# Patient Record
Sex: MALE | Employment: UNEMPLOYED | URBAN - METROPOLITAN AREA
[De-identification: names, ages, dates, MRNs, and addresses within clinical notes are randomized per-mention and may not be internally consistent; named-entity substitution may affect disease eponyms.]

---

## 2017-01-16 ENCOUNTER — HOSPITAL ENCOUNTER (OUTPATIENT)
Dept: LAB | Age: 52
Discharge: HOME | End: 2017-01-16

## 2017-01-16 DIAGNOSIS — E78.5 HYPERLIPIDEMIA, UNSPECIFIED HYPERLIPIDEMIA TYPE: ICD-10-CM

## 2017-01-16 DIAGNOSIS — I10 ESSENTIAL HYPERTENSION: ICD-10-CM

## 2017-01-16 DIAGNOSIS — E11.9 TYPE 2 DIABETES MELLITUS WITHOUT COMPLICATION, WITHOUT LONG-TERM CURRENT USE OF INSULIN (HCC): ICD-10-CM

## 2017-01-16 PROCEDURE — 99001 SPECIMEN HANDLING PT-LAB: CPT | Performed by: NURSE PRACTITIONER

## 2017-01-16 RX ORDER — ATORVASTATIN CALCIUM 20 MG/1
20 TABLET, FILM COATED ORAL
Qty: 30 TAB | Refills: 11 | Status: SHIPPED | OUTPATIENT
Start: 2017-01-16 | End: 2018-02-27 | Stop reason: SDUPTHER

## 2017-01-16 RX ORDER — METFORMIN HYDROCHLORIDE 500 MG/1
500 TABLET ORAL 2 TIMES DAILY WITH MEALS
Qty: 60 TAB | Refills: 11 | Status: SHIPPED | OUTPATIENT
Start: 2017-01-16 | End: 2017-10-24

## 2017-01-16 RX ORDER — LOSARTAN POTASSIUM 50 MG/1
50 TABLET ORAL DAILY
Qty: 30 TAB | Refills: 11 | Status: SHIPPED | OUTPATIENT
Start: 2017-01-16 | End: 2017-03-22 | Stop reason: SDUPTHER

## 2017-01-17 LAB
HCV GENOTYPE: NORMAL
HCV RNA SERPL NAA+PROBE-ACNC: NORMAL IU/ML
HCV RNA SERPL NAA+PROBE-LOG IU: NORMAL LOG10 IU/ML
TEST INFORMATION: NORMAL

## 2017-03-22 ENCOUNTER — OFFICE VISIT (OUTPATIENT)
Dept: INTERNAL MEDICINE CLINIC | Age: 52
End: 2017-03-22

## 2017-03-22 VITALS
TEMPERATURE: 98.1 F | DIASTOLIC BLOOD PRESSURE: 101 MMHG | HEIGHT: 62 IN | RESPIRATION RATE: 18 BRPM | OXYGEN SATURATION: 96 % | BODY MASS INDEX: 30.64 KG/M2 | SYSTOLIC BLOOD PRESSURE: 160 MMHG | HEART RATE: 92 BPM | WEIGHT: 166.5 LBS

## 2017-03-22 DIAGNOSIS — J30.89 PERENNIAL ALLERGIC RHINITIS, UNSPECIFIED ALLERGIC RHINITIS TRIGGER: ICD-10-CM

## 2017-03-22 DIAGNOSIS — E78.5 HYPERLIPIDEMIA, UNSPECIFIED HYPERLIPIDEMIA TYPE: ICD-10-CM

## 2017-03-22 DIAGNOSIS — J45.20 MILD INTERMITTENT ASTHMA WITHOUT COMPLICATION: ICD-10-CM

## 2017-03-22 DIAGNOSIS — I10 ESSENTIAL HYPERTENSION: ICD-10-CM

## 2017-03-22 DIAGNOSIS — B35.3 TINEA PEDIS OF BOTH FEET: ICD-10-CM

## 2017-03-22 DIAGNOSIS — R53.83 FATIGUE, UNSPECIFIED TYPE: ICD-10-CM

## 2017-03-22 DIAGNOSIS — E11.9 TYPE 2 DIABETES MELLITUS WITHOUT COMPLICATION, WITHOUT LONG-TERM CURRENT USE OF INSULIN (HCC): Primary | ICD-10-CM

## 2017-03-22 LAB
ALBUMIN UR QL STRIP: 80 MG/L
CHOLEST SERPL-MCNC: 171 MG/DL
CREATININE, URINE POC: 200 MG/DL
GLUCOSE POC: 192 MG/DL
HBA1C MFR BLD HPLC: 7.3 %
HDLC SERPL-MCNC: 59 MG/DL
LDL CHOLESTEROL POC: 96
MICROALBUMIN/CREAT RATIO POC: NORMAL MG/G
NON-HDL GOAL (POC): 112
TCHOL/HDL RATIO (POC): 2.9
TRIGL SERPL-MCNC: 80 MG/DL

## 2017-03-22 RX ORDER — PREDNISONE 20 MG/1
40 TABLET ORAL
Qty: 10 TAB | Refills: 0 | Status: SHIPPED | OUTPATIENT
Start: 2017-03-22 | End: 2017-10-24 | Stop reason: ALTCHOICE

## 2017-03-22 RX ORDER — LOSARTAN POTASSIUM 100 MG/1
100 TABLET ORAL DAILY
Qty: 30 TAB | Refills: 11 | Status: SHIPPED | OUTPATIENT
Start: 2017-03-22 | End: 2017-10-24 | Stop reason: ALTCHOICE

## 2017-03-22 RX ORDER — CHLORPHENIRAMINE MALEATE 4 MG
TABLET ORAL 2 TIMES DAILY
Qty: 15 G | Refills: 0 | Status: SHIPPED | OUTPATIENT
Start: 2017-03-22 | End: 2017-10-24

## 2017-03-22 NOTE — PATIENT INSTRUCTIONS
Take two tabs of LOSARTAN for 100 mg DOSE ONCE DAILY. Losartan (By mouth)   Losartan (raoul-VANESSA-tan)  Treats high blood pressure. Reduces the risk of stroke in patients with high blood pressure and an enlarged heart. Treats kidney disease in patients with diabetes. This medicine is an angiotensin receptor blocker (ARB). Brand Name(s):Cozaar   There may be other brand names for this medicine. When This Medicine Should Not Be Used: This medicine is not right for everyone. Do not use it if you had an allergic reaction to losartan, or if you are pregnant. Do not use this medicine together with aliskiren if you have diabetes. How to Use This Medicine:   Tablet  · Take your medicine as directed. Your dose may need to be changed several times to find what works best for you. · Drink plenty of fluids if you exercise, sweat more than usual, or have diarrhea or vomiting. · Read and follow the patient instructions that come with this medicine. Talk to your doctor or pharmacist if you have any questions. · Missed dose: Take a dose as soon as you remember. If it is almost time for your next dose, wait until then and take a regular dose. Do not take extra medicine to make up for a missed dose. · Store the medicine in a closed container at room temperature, away from heat, moisture, and direct light. Drugs and Foods to Avoid:   Ask your doctor or pharmacist before using any other medicine, including over-the-counter medicines, vitamins, and herbal products. · Some medicines can affect how losartan works.  Tell your doctor if you are using any of the following:   ¨ Lithium  ¨ Rifampin  ¨ A diuretic (water pill), such as spironolactone, triamterene, or amiloride  ¨ NSAID pain or arthritis medicine, such as aspirin, diclofenac, ibuprofen, or naproxen  ¨ Another blood pressure medicine, such as aliskiren, benazepril, enalapril, or lisinopril  · Ask your doctor before you use any medicine, supplement, or salt substitute that contains potassium. Warnings While Using This Medicine:   · It is not safe to take this medicine during pregnancy. It could harm an unborn baby. Tell your doctor right away if you become pregnant. · Tell your doctor if you are breastfeeding, or if you have kidney disease, liver disease, congestive heart failure, or diabetes. Tell your doctor if you have had angioedema that was caused by a blood pressure medicine. · This medicine could lower your blood pressure too much, especially when you first use it or if you are dehydrated. Stand or sit up slowly if you feel lightheaded or dizzy. · Your doctor will do lab tests at regular visits to check on the effects of this medicine. Keep all appointments. · Keep all medicine out of the reach of children. Never share your medicine with anyone. Possible Side Effects While Using This Medicine:   Call your doctor right away if you notice any of these side effects:  · Allergic reaction: Itching or hives, swelling in your face or hands, swelling or tingling in your mouth or throat, chest tightness, trouble breathing  · Change in how much or how often you urinate  · Confusion, weakness, uneven heartbeat, trouble breathing, numbness or tingling in your hands, feet, or lips  · Lightheadedness, dizziness, fainting  · Rapid weight gain, swelling in your hands, ankles, or feet  If you notice these less serious side effects, talk with your doctor:   · Diarrhea  · Tiredness  If you notice other side effects that you think are caused by this medicine, tell your doctor. Call your doctor for medical advice about side effects. You may report side effects to FDA at 5-038-WGB-7059  © 2016 [address] is for End User's use only and may not be sold, redistributed or otherwise used for commercial purposes. The above information is an  only. It is not intended as medical advice for individual conditions or treatments.  Talk to your doctor, nurse or pharmacist before following any medical regimen to see if it is safe and effective for you. Low Sodium Diet (2,000 Milligram): Care Instructions  Your Care Instructions  Too much sodium causes your body to hold on to extra water. This can raise your blood pressure and force your heart and kidneys to work harder. In very serious cases, this could cause you to be put in the hospital. It might even be life-threatening. By limiting sodium, you will feel better and lower your risk of serious problems. The most common source of sodium is salt. People get most of the salt in their diet from canned, prepared, and packaged foods. Fast food and restaurant meals also are very high in sodium. Your doctor will probably limit your sodium to less than 2,000 milligrams (mg) a day. This limit counts all the sodium in prepared and packaged foods and any salt you add to your food. Follow-up care is a key part of your treatment and safety. Be sure to make and go to all appointments, and call your doctor if you are having problems. It's also a good idea to know your test results and keep a list of the medicines you take. How can you care for yourself at home? Read food labels  · Read labels on cans and food packages. The labels tell you how much sodium is in each serving. Make sure that you look at the serving size. If you eat more than the serving size, you have eaten more sodium. · Food labels also tell you the Percent Daily Value for sodium. Choose products with low Percent Daily Values for sodium. · Be aware that sodium can come in forms other than salt, including monosodium glutamate (MSG), sodium citrate, and sodium bicarbonate (baking soda). MSG is often added to Asian food. When you eat out, you can sometimes ask for food without MSG or added salt.   Buy low-sodium foods  · Buy foods that are labeled \"unsalted\" (no salt added), \"sodium-free\" (less than 5 mg of sodium per serving), or \"low-sodium\" (less than 140 mg of sodium per serving). Foods labeled \"reduced-sodium\" and \"light sodium\" may still have too much sodium. Be sure to read the label to see how much sodium you are getting. · Buy fresh vegetables, or frozen vegetables without added sauces. Buy low-sodium versions of canned vegetables, soups, and other canned goods. Prepare low-sodium meals  · Cut back on the amount of salt you use in cooking. This will help you adjust to the taste. Do not add salt after cooking. One teaspoon of salt has about 2,300 mg of sodium. · Take the salt shaker off the table. · Flavor your food with garlic, lemon juice, onion, vinegar, herbs, and spices. Do not use soy sauce, lite soy sauce, steak sauce, onion salt, garlic salt, celery salt, mustard, or ketchup on your food. · Use low-sodium salad dressings, sauces, and ketchup. Or make your own salad dressings and sauces without adding salt. · Use less salt (or none) when recipes call for it. You can often use half the salt a recipe calls for without losing flavor. Other foods such as rice, pasta, and grains do not need added salt. · Rinse canned vegetables, and cook them in fresh water. This removes some--but not all--of the salt. · Avoid water that is naturally high in sodium or that has been treated with water softeners, which add sodium. Call your local water company to find out the sodium content of your water supply. If you buy bottled water, read the label and choose a sodium-free brand. Avoid high-sodium foods  · Avoid eating:  ¨ Smoked, cured, salted, and canned meat, fish, and poultry. ¨ Ham, ríos, hot dogs, and luncheon meats. ¨ Regular, hard, and processed cheese and regular peanut butter. ¨ Crackers with salted tops, and other salted snack foods such as pretzels, chips, and salted popcorn. ¨ Frozen prepared meals, unless labeled low-sodium. ¨ Canned and dried soups, broths, and bouillon, unless labeled sodium-free or low-sodium.   ¨ Canned vegetables, unless labeled sodium-free or low-sodium. ¨ [place] fries, pizza, tacos, and other fast foods. ¨ Pickles, olives, ketchup, and other condiments, especially soy sauce, unless labeled sodium-free or low-sodium. Where can you learn more? Go to http://sterling-bharti.info/. Enter W032 in the search box to learn more about \"Low Sodium Diet (2,000 Milligram): Care Instructions. \"  Current as of: July 26, 2016  Content Version: 11.1  © 4927-2576 [employer]. Care instructions adapted under license by [employer] (which disclaims liability or warranty for this information). If you have questions about a medical condition or this instruction, always ask your healthcare professional. [address] any warranty or liability for your use of this information.

## 2017-03-22 NOTE — PROGRESS NOTES
Issa Pinto is a 47 y.o. male and presents with Cholesterol Problem (follow up); Hypertension (follow up); and Diabetes (follow up)    Subjective:  Hypertension Review:  The patient has hypertension  Diet and Lifestyle: generally does try to follow a  low sodium diet, but drinks 4 cups of coffee daily. exercises some. Home BP Monitoring: is measured at home. Pertinent ROS: taking medications as instructed, no medication side effects noted. No TIA's, chest pain on exertion, dyspnea on exertion, or swelling of ankles. BP Readings from Last 3 Encounters:   03/22/17 (!) 160/101   12/22/16 (!) 151/96   04/17/16 126/71     Diabetes Mellitus Review:  He has diabetes mellitus, non-insulin  Diabetic ROS -negative for polyuria,polydipsia,polyphagia, and heat intolerance  Current diabetic medications include oral agents, NO insulin    Medication compliance: compliant MOST of the time  Diabetic diet compliance: compliant MOST of the time,   Yearly visit with dietician: no  Current exercise: walking, at work  Home glucose monitoring: is performed daily, averaging 140-160's  Any episodes of hypoglycemia? no  Known diabetic complications: none  Cardiovascular risk factors: family history, dyslipidemia, obesity, hypertension  Yearly foot exam: today  Eye exam current (within one year): not yet  Weight trend: stable   Is He on ACE inhibitor or angiotensin II receptor blocker? Yes   Lab review: orders written for new lab studies as appropriate; see orders. No results found for: HBA1C, HGBE8, [id number]          Dyslipidemia Review:  Patient presents for evaluation of lipids. Compliance with treatment thus far has been good. Denies myalgias, slurring speech, unilateral weakness, and chest pain. A repeat non-fasting lipid profile was done. The patient does NOT use medications that may worsen dyslipidemias (corticosteroids, progestins, anabolic steroids, diuretics, beta-blockers, amiodarone, cyclosporine, olanzapine).  The patient does exercise regularly at work. The patient is NOT known to have coexisting coronary artery disease. NOT taking Aspirin daily as prescribed/advised, pt preference. Asthma Review:  The patient is being seen for follow up of asthma,  currently progressively worsening with fatigue, congestion, and coughing at night. Asthma symptoms occur: infrequently. Wheezing when present is described as mild and easily NOT relieved with rescue bronchodilator, sensation of throat discomfort with Albuterol use reported. The patient reports NO use of a steroid inhaler. Frequency of use of quick-relief meds: rarely. Regimen compliance: The patient reports adherence to this regimen. Smoker, reluctant to quit. Review of Systems  Constitutional: negative for fevers, chills, anorexia and weight loss  Eyes:   negative for visual disturbance, drainage, and irritation  ENT:   negative for tinnitus,sore throat,nasal congestion,ear pain,and hoarseness  Respiratory:  negative for cough, hemoptysis, dyspnea, and wheezing  CV:   negative for chest pain, palpitations, and lower extremity edema  GI:   negative for nausea, vomiting, diarrhea, abdominal pain, and melena  Endo:               negative for polyuria,polydipsia,polyphagia, and heat intolerance  Genitourinary: negative for frequency, urgency, dysuria, retention, and hematuria  Integument:  negative for rash, ulcerations, and pruritus  Hematologic:  negative for easy bruising and bleeding  Musculoskel: negative for arthralgias, muscle weakness,and joint pain/swelling  Neurological:  negative for headaches, dizziness, vertigo,and memory/gait problems  Behavl/Psych: negative for feelings of anxiety, depression, suicide, and mood changes    Past Medical History:   Diagnosis Date    Diabetes ([facility] [address]     Hypertension      History reviewed. No pertinent surgical history.   Social History     Social History    Marital status:      Spouse name: N/A    Number of children: N/A    Years of education: N/A     Social History Main Topics    Smoking status: Current Every Day Smoker     Packs/day: 1.00    Smokeless tobacco: None    Alcohol use No    Drug use: No    Sexual activity: Yes     Partners: Female     Other Topics Concern    None     Social History Narrative     History reviewed. No pertinent family history. Current Outpatient Prescriptions   Medication Sig Dispense Refill    losartan (COZAAR) 50 mg tablet Take 1 Tab by mouth daily. 30 Tab 11    metFORMIN (GLUCOPHAGE) 500 mg tablet Take 1 Tab by mouth two (2) times daily (with meals). Indications: type 2 diabetes mellitus 60 Tab 11    atorvastatin (LIPITOR) 20 mg tablet Take 1 Tab by mouth nightly. Indications: mixed hyperlipidemia 30 Tab 11    cetirizine (ZYRTEC) 10 mg tablet Take 1 Tab by mouth daily. Indications: ALLERGIC RHINITIS 30 Tab 5    Cholecalciferol, Vitamin D3, (VITAMIN D3) 2,000 unit cap capsule Take 2,000 Units by mouth two (2) times a day. Indications: VITAMIN D DEFICIENCY (HIGH DOSE THERAPY) 60 Cap 11    albuterol (PROVENTIL HFA, VENTOLIN HFA, PROAIR HFA) 90 mcg/actuation inhaler Take 2 Puffs by inhalation every four (4) hours as needed for Wheezing or Shortness of Breath. 1 Inhaler 0     No Known Allergies    Objective:  Visit Vitals    BP (!) 160/101 (BP 1 Location: Right arm, BP Patient Position: Sitting)    Pulse 92    Temp 98.1 °F (36.7 °C) (Oral)    Resp 18    Ht 5' 2\" (1.575 m)    Wt 166 lb 8 oz (75.5 kg)    SpO2 96%    BMI 30.45 kg/m2     Wt Readings from Last 3 Encounters:   03/22/17 166 lb 8 oz (75.5 kg)   12/22/16 164 lb 8 oz (74.6 kg)   04/17/16 165 lb (74.8 kg)     Physical Exam:   General appearance - alert, well appearing, and in no distress. Mental status - A/O x 4, normal mood and affect. Neck -Supple ,normal CSP. FROM, non-tender. No significant adenopathy/thyromegaly. No JVD. Chest - CTA. Symmetric chest rise. No wheezing, rales or rhonchi.     Heart - Normal rate, regular rhythm. Normal S1, S2. No MGR or clicks. Abdomen - Soft,non-distended. Normoactive BS in all quadrants. NT, no mass or HSM. Ext- Radial, DP pulses, 2+ bilaterally. No pedal edema, clubbing, or cyanosis. Skin-Warm and dry. No hyperpigmentation, ulcerations, or suspicious lesions. Neuro - Normal speech, no focal findings or movement disorder. Normal strength, gait, and muscle tone. Diabetic foot exam performed by Keisha Starks NP     Measurement  Response Physician Comment   Monofilament  R - 4/6  L - 6/6    Pulse DP R - 2+ (normal)  L - 2+ (normal)    Pulse TP R - 1+ (weak)  L - 1+ (weak)    Structural deformity R - None  L - None    Skin Integrity / Deformity R - Mild - bluish discoloration with thick, yellow toenails and calloused plantar surface. L - Mild - bluish discoloration with thick, yellow toenails and calloused plantar surface. Results for orders placed or performed in visit on 03/22/17   AMB POC GLUCOSE BLOOD, BY GLUCOSE MONITORING DEVICE   Result Value Ref Range    Glucose  mg/dL   AMB POC LIPID PROFILE   Result Value Ref Range    Cholesterol (POC) 171     Triglycerides (POC) 80     HDL Cholesterol (POC) 59     LDL Cholesterol (POC) 96     Non-HDL Goal (POC) 112     TChol/HDL Ratio (POC) 2.9    AMB POC HEMOGLOBIN A1C   Result Value Ref Range    Hemoglobin A1c (POC) 7.3 %     Assessment/Plan:  Increased Losartan to 100 mg and prednisone 40 mg x 5 days prescribed. May need ICS or diuretic INI at f/u  Medication Side Effects and Warnings were discussed with patient: yes   Patient Labs were reviewed: yes  Patient Past Records were reviewed: yes    See below for other orders   Follow-up Disposition: Not on File    Pt has given consent verbally while in office for [employer] messaging. ICD-10-CM ICD-9-CM    1.  Type 2 diabetes mellitus without complication, without long-term current use of insulin (HCC) E11.9 250.00 AMB POC GLUCOSE BLOOD, BY GLUCOSE MONITORING DEVICE      AMB POC HEMOGLOBIN A1C   2. Hyperlipidemia, unspecified hyperlipidemia type E78.5 272.4 AMB POC LIPID PROFILE   3. Essential hypertension I10 401.9 AMB POC LIPID PROFILE   4. Fatigue, unspecified type R53.83 780.79      Orders Placed This Encounter    AMB POC GLUCOSE BLOOD, BY GLUCOSE MONITORING DEVICE    AMB POC LIPID PROFILE    AMB POC HEMOGLOBIN A1C       Brayan Lyn expressed understanding of plan. An After Visit Summary was offered/printed and given to the patient.

## 2017-03-22 NOTE — PROGRESS NOTES
Pt is here for   Chief Complaint   Patient presents with    Cholesterol Problem     follow up    Hypertension     follow up    Diabetes     follow up     Pt denies pain at this time. ..    1. Have you been to the ER, urgent care clinic since your last visit? Hospitalized since your last visit? No    2. Have you seen or consulted any other health care providers outside of the [address] [place] [address] since your last visit? Include any pap smears or colon screening.  No

## 2017-03-22 NOTE — MR AVS SNAPSHOT
Visit Information Date & Time Provider Department Dept. Phone Encounter #  
 3/22/2017 10:40 AM Silvia Newby NP [address] [facility] [phone] [id number] Follow-up Instructions Return in about 4 weeks (around 4/19/2017) for HTN, AR and asthma. Upcoming Health Maintenance Date Due  
 EYE EXAM RETINAL OR DILATED Q1 6/20/2017* HEMOGLOBIN A1C Q6M 6/22/2017 LIPID PANEL Q1 12/22/2017 FOBT Q 1 YEAR AGE 50-75 12/22/2017 FOOT EXAM Q1 3/22/2018 MICROALBUMIN Q1 3/22/2018 DTaP/Tdap/Td series (2 - Td) 12/22/2026 *Topic was postponed. The date shown is not the original due date. Allergies as of 3/22/2017  Review Complete On: 3/22/2017 By: Silvia Newby NP No Known Allergies Current Immunizations  Reviewed on 12/22/2016 Name Date Influenza Vaccine (Quad) Intradermal PF 12/22/2016 Tdap 12/22/2016 Not reviewed this visit You Were Diagnosed With   
  
 Codes Comments Type 2 diabetes mellitus without complication, without long-term current use of insulin (HCC)    -  Primary ICD-10-CM: E11.9 ICD-9-CM: 250.00 Hyperlipidemia, unspecified hyperlipidemia type     ICD-10-CM: E78.5 ICD-9-CM: 272.4 Essential hypertension     ICD-10-CM: I10 
ICD-9-CM: 401.9 Fatigue, unspecified type     ICD-10-CM: R53.83 ICD-9-CM: 780.79 Mild intermittent asthma without complication     PYN-19-ME: J45.20 ICD-9-CM: 493.90 Perennial allergic rhinitis, unspecified allergic rhinitis trigger     ICD-10-CM: J30.89 ICD-9-CM: 477.8 Vitals BP Pulse Temp Resp Height(growth percentile) Weight(growth percentile) (!) 160/101 (BP 1 Location: Right arm, BP Patient Position: Sitting) 92 98.1 °F (36.7 °C) (Oral) 18 5' 2\" (1.575 m) 166 lb 8 oz (75.5 kg) SpO2 BMI Smoking Status 96% 30.45 kg/m2 Current Every Day Smoker Vitals History BMI and BSA Data  Body Mass Index Body Surface Area  
 30.45 kg/m 2 1.82 m 2  
  
 Preferred Pharmacy Pharmacy Name Phone Se[facility] Nunez [facility] [address], [address] [facility] [phone] Your Updated Medication List  
  
   
This list is accurate as of: 3/22/17 11:38 AM.  Always use your most recent med list.  
  
  
  
  
 albuterol 90 mcg/actuation inhaler Commonly known as:  PROVENTIL HFA, VENTOLIN HFA, PROAIR HFA Take 2 Puffs by inhalation every four (4) hours as needed for Wheezing or Shortness of Breath. atorvastatin 20 mg tablet Commonly known as:  LIPITOR Take 1 Tab by mouth nightly. Indications: mixed hyperlipidemia  
  
 cetirizine 10 mg tablet Commonly known as:  ZYRTEC Take 1 Tab by mouth daily. Indications: ALLERGIC RHINITIS Cholecalciferol (Vitamin D3) 2,000 unit Cap capsule Commonly known as:  VITAMIN D3 Take 2,000 Units by mouth two (2) times a day. Indications: VITAMIN D DEFICIENCY (HIGH DOSE THERAPY)  
  
 losartan 100 mg tablet Commonly known as:  COZAAR Take 1 Tab by mouth daily. Indications: hypertension  
  
 metFORMIN 500 mg tablet Commonly known as:  GLUCOPHAGE Take 1 Tab by mouth two (2) times daily (with meals). Indications: type 2 diabetes mellitus  
  
 predniSONE 20 mg tablet Commonly known as:  Adrianna Brink Take 2 Tabs by mouth daily (with breakfast). Prescriptions Sent to Pharmacy Refills  
 losartan (COZAAR) 100 mg tablet 11 Sig: Take 1 Tab by mouth daily. Indications: hypertension Class: Normal  
 Pharmacy: [facility] [facility] Ph #: [phone] Route: Oral  
 predniSONE (DELTASONE) 20 mg tablet 0 Sig: Take 2 Tabs by mouth daily (with breakfast). Class: Normal  
 Pharmacy: [facility], [address] [facility] Ph #: [phone] Route: Oral  
  
We Performed the Following AMB POC GLUCOSE BLOOD, BY GLUCOSE MONITORING DEVICE [28251 CPT(R)] AMB POC HEMOGLOBIN A1C [07466 CPT(R)] AMB POC LIPID PROFILE [19566 CPT(R)] AMB POC URINE, MICROALBUMIN, SEMIQUANT (3 RESULTS) [83506 CPT(R)]  DIABETES FOOT EXAM [HM7 Custom] REFERRAL TO OPHTHALMOLOGY [REF57 Custom] Follow-up Instructions Return in about 4 weeks (around 4/19/2017) for HTN, AR and asthma. Referral Information Referral ID Referred By Referred To  
  
 [id number] Nandini Morton M.D.   
   [place] [address], [address] Visits Status Start Date End Date 1 New Request 3/22/17 3/22/18 If your referral has a status of pending review or denied, additional information will be sent to support the outcome of this decision. Patient Instructions Take two tabs of LOSARTAN for 100 mg DOSE ONCE DAILY. Losartan (By mouth) Losartan (raoul-VANESSA-tan) Treats high blood pressure. Reduces the risk of stroke in patients with high blood pressure and an enlarged heart. Treats kidney disease in patients with diabetes. This medicine is an angiotensin receptor blocker (ARB). Brand Name(s):Cozaar There may be other brand names for this medicine. When This Medicine Should Not Be Used: This medicine is not right for everyone. Do not use it if you had an allergic reaction to losartan, or if you are pregnant. Do not use this medicine together with aliskiren if you have diabetes. How to Use This Medicine:  
Tablet · Take your medicine as directed. Your dose may need to be changed several times to find what works best for you. · Drink plenty of fluids if you exercise, sweat more than usual, or have diarrhea or vomiting. · Read and follow the patient instructions that come with this medicine. Talk to your doctor or pharmacist if you have any questions. · Missed dose: Take a dose as soon as you remember.  If it is almost time for your next dose, wait until then and take a regular dose. Do not take extra medicine to make up for a missed dose. · Store the medicine in a closed container at room temperature, away from heat, moisture, and direct light. Drugs and Foods to Avoid: Ask your doctor or pharmacist before using any other medicine, including over-the-counter medicines, vitamins, and herbal products. · Some medicines can affect how losartan works. Tell your doctor if you are using any of the following: ¨ Lithium ¨ Rifampin ¨ A diuretic (water pill), such as spironolactone, triamterene, or amiloride ¨ NSAID pain or arthritis medicine, such as aspirin, diclofenac, ibuprofen, or naproxen ¨ Another blood pressure medicine, such as aliskiren, benazepril, enalapril, or lisinopril · Ask your doctor before you use any medicine, supplement, or salt substitute that contains potassium. Warnings While Using This Medicine: · It is not safe to take this medicine during pregnancy. It could harm an unborn baby. Tell your doctor right away if you become pregnant. · Tell your doctor if you are breastfeeding, or if you have kidney disease, liver disease, congestive heart failure, or diabetes. Tell your doctor if you have had angioedema that was caused by a blood pressure medicine. · This medicine could lower your blood pressure too much, especially when you first use it or if you are dehydrated. Stand or sit up slowly if you feel lightheaded or dizzy. · Your doctor will do lab tests at regular visits to check on the effects of this medicine. Keep all appointments. · Keep all medicine out of the reach of children. Never share your medicine with anyone. Possible Side Effects While Using This Medicine:  
Call your doctor right away if you notice any of these side effects: · Allergic reaction: Itching or hives, swelling in your face or hands, swelling or tingling in your mouth or throat, chest tightness, trouble breathing · Change in how much or how often you urinate · Confusion, weakness, uneven heartbeat, trouble breathing, numbness or tingling in your hands, feet, or lips · Lightheadedness, dizziness, fainting · Rapid weight gain, swelling in your hands, ankles, or feet If you notice these less serious side effects, talk with your doctor: · Diarrhea · Tiredness If you notice other side effects that you think are caused by this medicine, tell your doctor. Call your doctor for medical advice about side effects. You may report side effects to FDA at 7-646-PPA-0456 © 2016 [address] is for End User's use only and may not be sold, redistributed or otherwise used for commercial purposes. The above information is an  only. It is not intended as medical advice for individual conditions or treatments. Talk to your doctor, nurse or pharmacist before following any medical regimen to see if it is safe and effective for you. Low Sodium Diet (2,000 Milligram): Care Instructions Your Care Instructions Too much sodium causes your body to hold on to extra water. This can raise your blood pressure and force your heart and kidneys to work harder. In very serious cases, this could cause you to be put in the hospital. It might even be life-threatening. By limiting sodium, you will feel better and lower your risk of serious problems. The most common source of sodium is salt. People get most of the salt in their diet from canned, prepared, and packaged foods. Fast food and restaurant meals also are very high in sodium. Your doctor will probably limit your sodium to less than 2,000 milligrams (mg) a day. This limit counts all the sodium in prepared and packaged foods and any salt you add to your food. Follow-up care is a key part of your treatment and safety.  Be sure to make and go to all appointments, and call your doctor if you are having problems. It's also a good idea to know your test results and keep a list of the medicines you take. How can you care for yourself at home? Read food labels · Read labels on cans and food packages. The labels tell you how much sodium is in each serving. Make sure that you look at the serving size. If you eat more than the serving size, you have eaten more sodium. · Food labels also tell you the Percent Daily Value for sodium. Choose products with low Percent Daily Values for sodium. · Be aware that sodium can come in forms other than salt, including monosodium glutamate (MSG), sodium citrate, and sodium bicarbonate (baking soda). MSG is often added to Asian food. When you eat out, you can sometimes ask for food without MSG or added salt. Buy low-sodium foods · Buy foods that are labeled \"unsalted\" (no salt added), \"sodium-free\" (less than 5 mg of sodium per serving), or \"low-sodium\" (less than 140 mg of sodium per serving). Foods labeled \"reduced-sodium\" and \"light sodium\" may still have too much sodium. Be sure to read the label to see how much sodium you are getting. · Buy fresh vegetables, or frozen vegetables without added sauces. Buy low-sodium versions of canned vegetables, soups, and other canned goods. Prepare low-sodium meals · Cut back on the amount of salt you use in cooking. This will help you adjust to the taste. Do not add salt after cooking. One teaspoon of salt has about 2,300 mg of sodium. · Take the salt shaker off the table. · Flavor your food with garlic, lemon juice, onion, vinegar, herbs, and spices. Do not use soy sauce, lite soy sauce, steak sauce, onion salt, garlic salt, celery salt, mustard, or ketchup on your food. · Use low-sodium salad dressings, sauces, and ketchup. Or make your own salad dressings and sauces without adding salt. · Use less salt (or none) when recipes call for it.  You can often use half the salt a recipe calls for without losing flavor. Other foods such as rice, pasta, and grains do not need added salt. · Rinse canned vegetables, and cook them in fresh water. This removes somebut not allof the salt. · Avoid water that is naturally high in sodium or that has been treated with water softeners, which add sodium. Call your local water company to find out the sodium content of your water supply. If you buy bottled water, read the label and choose a sodium-free brand. Avoid high-sodium foods · Avoid eating: ¨ Smoked, cured, salted, and canned meat, fish, and poultry. ¨ Ham, ríos, hot dogs, and luncheon meats. ¨ Regular, hard, and processed cheese and regular peanut butter. ¨ Crackers with salted tops, and other salted snack foods such as pretzels, chips, and salted popcorn. ¨ Frozen prepared meals, unless labeled low-sodium. ¨ Canned and dried soups, broths, and bouillon, unless labeled sodium-free or low-sodium. ¨ Canned vegetables, unless labeled sodium-free or low-sodium. ¨ [place] fries, pizza, tacos, and other fast foods. ¨ Pickles, olives, ketchup, and other condiments, especially soy sauce, unless labeled sodium-free or low-sodium. Where can you learn more? Go to http://sterling-bharti.info/. Enter K267 in the search box to learn more about \"Low Sodium Diet (2,000 Milligram): Care Instructions. \" Current as of: July 26, 2016 Content Version: 11.1 © 6132-9616 [employer]. Care instructions adapted under license by [employer] (which disclaims liability or warranty for this information). If you have questions about a medical condition or this instruction, always ask your healthcare professional. [address] any warranty or liability for your use of this information. Introducing [facility]!    
 Kinga Tejada introduces [employer] patient portal. Now you can access parts of your medical record, email your doctor's office, and request medication refills online. 1. In your internet browser, go to [email or web address] 2. Click on the First Time User? Click Here link in the Sign In box. You will see the New Member Sign Up page. 3. Enter your Web[employer] Access Code exactly as it appears below. You will not need to use this code after youve completed the sign-up process. If you do not sign up before the expiration date, you must request a new code. · [employer] Access Code: [email or web address] Expires: 3/22/2017 12:37 PM 
 
4. Enter the last four digits of your Social Security Number (xxxx) and Date of Birth (mm/dd/yyyy) as indicated and click Submit. You will be taken to the next sign-up page. 5. Create a Webcrumbz ID. This will be your Webcrumb[employer] login ID and cannot be changed, so think of one that is secure and easy to remember. 6. Create a [employer] password. You can change your password at any time. 7. Enter your Password Reset Question and Answer. This can be used at a later time if you forget your password. 8. Enter your e-mail address. You will receive e-mail notification when new information is available in [address] 9. Click Sign Up. You can now view and download portions of your medical record. 10. Click the Download Summary menu link to download a portable copy of your medical information. If you have questions, please visit the Frequently Asked Questions section of the [employer] website. Remember, [employer] is NOT to be used for urgent needs. For medical emergencies, dial 911. Now available from your iPhone and Android! Please provide this summary of care documentation to your next provider. Your primary care clinician is listed as GEORGES Morales. If you have any questions after today's visit, please call [phone].

## 2017-10-24 ENCOUNTER — OFFICE VISIT (OUTPATIENT)
Dept: INTERNAL MEDICINE CLINIC | Age: 52
End: 2017-10-24

## 2017-10-24 VITALS
SYSTOLIC BLOOD PRESSURE: 165 MMHG | OXYGEN SATURATION: 95 % | TEMPERATURE: 99.9 F | DIASTOLIC BLOOD PRESSURE: 99 MMHG | HEART RATE: 81 BPM | WEIGHT: 160.6 LBS | HEIGHT: 62 IN | RESPIRATION RATE: 20 BRPM | BODY MASS INDEX: 29.55 KG/M2

## 2017-10-24 DIAGNOSIS — I10 ESSENTIAL HYPERTENSION: ICD-10-CM

## 2017-10-24 DIAGNOSIS — E78.5 HYPERLIPIDEMIA, UNSPECIFIED HYPERLIPIDEMIA TYPE: ICD-10-CM

## 2017-10-24 DIAGNOSIS — J45.21 MILD INTERMITTENT ASTHMA WITH ACUTE EXACERBATION: ICD-10-CM

## 2017-10-24 DIAGNOSIS — E11.9 TYPE 2 DIABETES MELLITUS WITHOUT COMPLICATION, WITHOUT LONG-TERM CURRENT USE OF INSULIN (HCC): Primary | ICD-10-CM

## 2017-10-24 DIAGNOSIS — J30.89 PERENNIAL ALLERGIC RHINITIS: ICD-10-CM

## 2017-10-24 LAB
CHOLEST SERPL-MCNC: 197 MG/DL
GLUCOSE POC: 164 MG/DL
HBA1C MFR BLD HPLC: 6.4 %
HDLC SERPL-MCNC: 61 MG/DL
LDL CHOLESTEROL POC: 136 MG/DL
TCHOL/HDL RATIO (POC): 3.2
TRIGL SERPL-MCNC: 46 MG/DL
VLDLC SERPL CALC-MCNC: 127 MG/DL

## 2017-10-24 RX ORDER — LOSARTAN POTASSIUM AND HYDROCHLOROTHIAZIDE 12.5; 5 MG/1; MG/1
1 TABLET ORAL DAILY
Qty: 30 TAB | Refills: 11 | Status: SHIPPED | OUTPATIENT
Start: 2017-10-24 | End: 2017-11-29 | Stop reason: SDUPTHER

## 2017-10-24 RX ORDER — PREDNISONE 20 MG/1
40 TABLET ORAL
Qty: 10 TAB | Refills: 0 | Status: SHIPPED | OUTPATIENT
Start: 2017-10-24 | End: 2017-11-29

## 2017-10-24 RX ORDER — ALBUTEROL SULFATE 90 UG/1
2 AEROSOL, METERED RESPIRATORY (INHALATION)
Qty: 1 INHALER | Refills: 0 | Status: SHIPPED | OUTPATIENT
Start: 2017-10-24 | End: 2019-03-12 | Stop reason: SDUPTHER

## 2017-10-24 NOTE — PATIENT INSTRUCTIONS
Try WIDE TOE/WIDTH shoes OR cushion on 4th toe to help with discomfort from pinky toe. STOP taking METFORMIN for NOW. RESTART Losartan and ATORVASTATIN. Controlling Your Asthma: Care Instructions  Your Care Instructions    Asthma is a long-term condition that affects your breathing. It causes the airways that lead to the lungs to swell. During an asthma attack, the airways swell and narrow. This makes it hard to breathe. You may wheeze or cough. If you have a bad attack, you may need emergency care. There are two things to do to treat asthma. · Control asthma over the long term. · Treat attacks when they occur. You and your doctor can make an asthma action plan. It tells you what medicines you need to take every day to control asthma symptoms and what to do if you have an asthma attack. Your asthma action plan can help prevent and treat attacks. When you keep your asthma under control, you can prevent severe attacks and lasting damage to your airways. You need to treat your asthma even when you are not having symptoms. Although asthma is a lifelong disease, treatment can help control it and help you stay healthy. Follow-up care is a key part of your treatment and safety. Be sure to make and go to all appointments, and call your doctor if you are having problems. It's also a good idea to know your test results and keep a list of the medicines you take. How can you care for yourself at home? To control asthma over the long term  Medicines  Controller medicines reduce swelling in your lungs. They also prevent asthma attacks. Take your controller medicine exactly as prescribed. Talk to your doctor if you have any problems with your medicine. · Inhaled corticosteroid is a common and effective controller medicine. Using it the right way can prevent or reduce most side effects. · Take your controller medicine every day, not just when you have symptoms.  It helps prevent problems before they occur.  · Your doctor may prescribe another medicine that you use along with the corticosteroid. This is often a long-acting bronchodilator. Do not take this medicine by itself. Using a long-acting bronchodilator by itself can increase your risk of a severe or fatal asthma attack. · Do not take inhaled corticosteroids or long-acting bronchodilators to stop an asthma attack that has already started. They don't work fast enough to help. · Talk to your doctor before you use other medicines. Some medicines, such as aspirin, can cause asthma attacks in some people. Education  · Learn what triggers an asthma attack. Avoid these triggers when you can. Common triggers include colds, smoke, air pollution, dust, pollen, mold, pets, cockroaches, stress, and cold air. · Check yourself for asthma symptoms to know which step to follow in your action plan. Watch for things like being short of breath, having chest tightness, coughing, and wheezing. Also notice if symptoms wake you up at night or if you get tired quickly when you exercise. · If you have a peak flow meter, use it to check how well you are breathing. It can help you know when an asthma attack is going to occur. Then you can take medicine to prevent the asthma attack or make it less severe. · Do not smoke or allow others to smoke around you. Avoid smoky places. Smoking makes asthma worse. If you need help quitting, talk to your doctor about stop-smoking programs and medicines. These can increase your chances of quitting for good. · Avoid colds and the flu. Get a pneumococcal vaccine shot. If you have had one before, ask your doctor whether you need a second dose. Get a flu vaccine every year, as soon as it's available. If you must be around people with colds or the flu, wash your hands often. To treat attacks when they occur  Use your asthma action plan when you have an attack. Your quick-relief medicine will stop an asthma attack.  It relaxes the muscles that get tight around the airways. If your doctor prescribed corticosteroid pills to use during an attack, take them as directed. They may take hours to work, but they may shorten the attack and help you breathe better. · Albuterol is an effective quick-relief inhaler. · Take your quick-relief medicine exactly as prescribed. · Always bring your asthma medicine with you when you travel. · You may need to use quick-relief medicine before you exercise. · Call your doctor if you think you are having a problem with your medicine. When should you call for help? Call 911 anytime you think you may need emergency care. For example, call if:  · You are having severe trouble breathing. Call your doctor now or seek immediate medical care if:  · Your symptoms do not get better after you have followed your asthma action plan. · You cough up yellow, dark brown, or bloody mucus (sputum). Watch closely for changes in your health, and be sure to contact your doctor if:  · Your coughing and wheezing get worse. · You need to use your quick-relief medicine on more than 2 days a week (unless it is just for exercise). · You need help figuring out what is triggering your asthma attacks. Where can you learn more? Go to http://sterling-bharti.info/. Enter G427 in the search box to learn more about \"Controlling Your Asthma: Care Instructions. \"  Current as of: March 25, 2017  Content Version: 11.3  © 6104-2148 [employer]. Care instructions adapted under license by [employer] (which disclaims liability or warranty for this information). If you have questions about a medical condition or this instruction, always ask your healthcare professional. [place] [address] any warranty or liability for your use of this information. Learning About the [address] Diet  What is the Mediterranean diet? The Mediterranean diet is a style of eating rather than a diet plan.  It features foods eaten in [place], [place], [facility] and [facility], and other countries along the [facility]. It emphasizes eating foods like fish, fruits, vegetables, beans, high-fiber breads and whole grains, nuts, and olive oil. This style of eating includes limited red meat, cheese, and sweets. Why choose the Mediterranean diet? A Mediterranean-style diet may improve heart health. It contains more fat than other heart-healthy diets. But the fats are mainly from nuts, unsaturated oils (such as fish oils and olive oil), and certain nut or seed oils (such as canola, soybean, or flaxseed oil). These fats may help protect the heart and blood vessels. How can you get started on the Mediterranean diet? Here are some things you can do to switch to a more Mediterranean way of eating. What to eat  · Eat a variety of fruits and vegetables each day, such as grapes, blueberries, tomatoes, broccoli, peppers, figs, olives, spinach, eggplant, beans, lentils, and chickpeas. · Eat a variety of whole-grain foods each day, such as oats, brown rice, and whole wheat bread, pasta, and couscous. · Eat fish at least 2 times a week. Try tuna, salmon, mackerel, lake trout, herring, or sardines. · Eat moderate amounts of low-fat dairy products, such as milk, cheese, or yogurt. · Eat moderate amounts of poultry and eggs. · Choose healthy (unsaturated) fats, such as nuts, olive oil, and certain nut or seed oils like canola, soybean, and flaxseed. · Limit unhealthy (saturated) fats, such as butter, palm oil, and coconut oil. And limit fats found in animal products, such as meat and dairy products made with whole milk. Try to eat red meat only a few times a month in very small amounts. · Limit sweets and desserts to only a few times a week. This includes sugar-sweetened drinks like soda. The Mediterranean diet may also include red wine with your meal--1 glass each day for women and up to 2 glasses a day for men.   Tips for eating at home  · Use herbs, spices, garlic, lemon zest, and citrus juice instead of salt to add flavor to foods. · Add avocado slices to your sandwich instead of ríos. · Have fish for lunch or dinner instead of red meat. Brush the fish with olive oil, and broil or grill it. · Sprinkle your salad with seeds or nuts instead of cheese. · Cook with olive or canola oil instead of butter or oils that are high in saturated fat. · Switch from 2% milk or whole milk to 1% or fat-free milk. · Dip raw vegetables in a vinaigrette dressing or hummus instead of dips made from mayonnaise or sour cream.  · Have a piece of fruit for dessert instead of a piece of cake. Try baked apples, or have some dried fruit. Tips for eating out  · Try broiled, grilled, baked, or poached fish instead of having it fried or breaded. · Ask your  to have your meals prepared with olive oil instead of butter. · Order dishes made with marinara sauce or sauces made from olive oil. Avoid sauces made from cream or mayonnaise. · Choose whole-grain breads, whole wheat pasta and pizza crust, brown rice, beans, and lentils. · Cut back on butter or margarine on bread. Instead, you can dip your bread in a small amount of olive oil. · Ask for a side salad or grilled vegetables instead of french fries or chips. Where can you learn more? Go to http://sterling-bharti.info/. Enter [phone] in the search box to learn more about \"Learning About the Mediterranean Diet. \"  Current as of: December 29, 2016  Content Version: 11.3  © 9413-0264 [employer]. Care instructions adapted under license by [employer] (which disclaims liability or warranty for this information). If you have questions about a medical condition or this instruction, always ask your healthcare professional. T[place] any warranty or liability for your use of this information.     You would benefit from more adequate rest. Please be sure to remove yourself from stimulating activities, like using your cellphone, tablet, or watching T.V. About 2 hours before bedtime. Instead engage in relaxing activities, like reading, yoga, or listening to calming music. Also try to refrain from using these devices in your sleep space or bed, as this trains your brain to stay awake in the bed versus sleeping. Avoid caffeine containing products like coffee, tea, chocolate, and soda within 2 hours of bedtime and try over the counter meds as needed to help like ZZZquil, benadryl, UNISOM or melatonin (they are non-habit forming).

## 2017-10-24 NOTE — PROGRESS NOTES
Pt is here for   Chief Complaint   Patient presents with    Hypertension     follow up    Diabetes     follow up    Cough     x 5 days      Pt denies pain at this time. 1. Have you been to the ER, urgent care clinic since your last visit? Hospitalized since your last visit? No    2. Have you seen or consulted any other health care providers outside of the [facility] since your last visit? Include any pap smears or colon screening.  No

## 2017-10-24 NOTE — MR AVS SNAPSHOT
Visit Information Date & Time Provider Department Dept. Phone Encounter #  
 10/24/2017  1:00 PM Damaso Pabon NP [facility]  10. [id number] Follow-up Instructions Return in about 3 months (around 1/24/2018) for HTN, DM f/u. BP check with wife's visit this week. Upcoming Health Maintenance Date Due FOBT Q 1 YEAR AGE 50-75 12/22/2017 EYE EXAM RETINAL OR DILATED Q1 11/23/2017* INFLUENZA AGE 9 TO ADULT 11/23/2017* FOOT EXAM Q1 3/22/2018 MICROALBUMIN Q1 3/22/2018 HEMOGLOBIN A1C Q6M 4/24/2018 LIPID PANEL Q1 10/24/2018 DTaP/Tdap/Td series (2 - Td) 12/22/2026 *Topic was postponed. The date shown is not the original due date. Allergies as of 10/24/2017  Review Complete On: 10/24/2017 By: Janny Staley. Bosher, LPN No Known Allergies Current Immunizations  Reviewed on 12/22/2016 Name Date Influenza Vaccine (Quad) Intradermal PF 12/22/2016 Tdap 12/22/2016 Not reviewed this visit You Were Diagnosed With   
  
 Codes Comments Type 2 diabetes mellitus without complication, without long-term current use of insulin (HCC)    -  Primary ICD-10-CM: E11.9 ICD-9-CM: 250.00 Hyperlipidemia, unspecified hyperlipidemia type     ICD-10-CM: E78.5 ICD-9-CM: 272.4 Essential hypertension     ICD-10-CM: I10 
ICD-9-CM: 401.9 Mild intermittent asthma with acute exacerbation     ICD-10-CM: J45.21 ICD-9-CM: 575.16 Perennial allergic rhinitis     ICD-10-CM: J30.89 ICD-9-CM: 477.8 Vitals BP Pulse Temp Resp Height(growth percentile) Weight(growth percentile) (!) 165/99 (BP 1 Location: Right arm, BP Patient Position: Sitting) 81 99.9 °F (37.7 °C) (Oral) 20 5' 2\" (1.575 m) 160 lb 9.6 oz (72.8 kg) SpO2 BMI Smoking Status 95% 29.37 kg/m2 Current Every Day Smoker Vitals History BMI and BSA Data Body Mass Index Body Surface Area  
 29.37 kg/m 2 1.78 m 2 Preferred Pharmacy Pharmacy Name Phone [facility] [address] [facility] [phone] Your Updated Medication List  
  
   
This list is accurate as of: 10/24/17  2:28 PM.  Always use your most recent med list.  
  
  
  
  
 albuterol 90 mcg/actuation inhaler Commonly known as:  PROVENTIL HFA, VENTOLIN HFA, PROAIR HFA Take 2 Puffs by inhalation every four (4) hours as needed for Wheezing or Shortness of Breath. atorvastatin 20 mg tablet Commonly known as:  LIPITOR Take 1 Tab by mouth nightly. Indications: mixed hyperlipidemia  
  
 cetirizine 10 mg tablet Commonly known as:  ZYRTEC Take 1 Tab by mouth daily. Indications: ALLERGIC RHINITIS Cholecalciferol (Vitamin D3) 2,000 unit Cap capsule Commonly known as:  VITAMIN D3 Take 2,000 Units by mouth two (2) times a day. Indications: VITAMIN D DEFICIENCY (HIGH DOSE THERAPY)  
  
 losartan 100 mg tablet Commonly known as:  COZAAR Take 1 Tab by mouth daily. Indications: hypertension  
  
 predniSONE 20 mg tablet Commonly known as:  Rasta Will Take 2 Tabs by mouth daily (with breakfast). Prescriptions Sent to Pharmacy Refills  
 albuterol (PROVENTIL HFA, VENTOLIN HFA, PROAIR HFA) 90 mcg/actuation inhaler 0 Sig: Take 2 Puffs by inhalation every four (4) hours as needed for Wheezing or Shortness of Breath. Class: Normal  
 Pharmacy: [facility], [address] AT [address] [place] [address] Ph #: [phone] Route: Inhalation  
 predniSONE (DELTASONE) 20 mg tablet 0 Sig: Take 2 Tabs by mouth daily (with breakfast). Class: Normal  
 Pharmacy: [facility], [address] AT [address] [place] [address] Ph #: [phone] Route: Oral  
  
We Performed the Following AMB POC GLUCOSE BLOOD, BY GLUCOSE MONITORING DEVICE [75646 CPT(R)] AMB POC HEMOGLOBIN A1C [77900 CPT(R)] AMB POC LIPID PROFILE [98394 CPT(R)] Follow-up Instructions Return in about 3 months (around 1/24/2018) for HTN, DM f/u. BP check with wife's visit this week. Patient Instructions Try WIDE TOE/WIDTH shoes OR cushion on 4th toe to help with discomfort from pinky toe. STOP taking METFORMIN for NOW. RESTART Losartan and ATORVASTATIN. Controlling Your Asthma: Care Instructions Your Care Instructions Asthma is a long-term condition that affects your breathing. It causes the airways that lead to the lungs to swell. During an asthma attack, the airways swell and narrow. This makes it hard to breathe. You may wheeze or cough. If you have a bad attack, you may need emergency care. There are two things to do to treat asthma. · Control asthma over the long term. · Treat attacks when they occur. You and your doctor can make an asthma action plan. It tells you what medicines you need to take every day to control asthma symptoms and what to do if you have an asthma attack. Your asthma action plan can help prevent and treat attacks. When you keep your asthma under control, you can prevent severe attacks and lasting damage to your airways. You need to treat your asthma even when you are not having symptoms. Although asthma is a lifelong disease, treatment can help control it and help you stay healthy. Follow-up care is a key part of your treatment and safety. Be sure to make and go to all appointments, and call your doctor if you are having problems. It's also a good idea to know your test results and keep a list of the medicines you take. How can you care for yourself at home? To control asthma over the long term Medicines Controller medicines reduce swelling in your lungs. They also prevent asthma attacks. Take your controller medicine exactly as prescribed. Talk to your doctor if you have any problems with your medicine. · Inhaled corticosteroid is a common and effective controller medicine. Using it the right way can prevent or reduce most side effects. · Take your controller medicine every day, not just when you have symptoms. It helps prevent problems before they occur. · Your doctor may prescribe another medicine that you use along with the corticosteroid. This is often a long-acting bronchodilator. Do not take this medicine by itself. Using a long-acting bronchodilator by itself can increase your risk of a severe or fatal asthma attack. · Do not take inhaled corticosteroids or long-acting bronchodilators to stop an asthma attack that has already started. They don't work fast enough to help. · Talk to your doctor before you use other medicines. Some medicines, such as aspirin, can cause asthma attacks in some people. Education · Learn what triggers an asthma attack. Avoid these triggers when you can. Common triggers include colds, smoke, air pollution, dust, pollen, mold, pets, cockroaches, stress, and cold air. · Check yourself for asthma symptoms to know which step to follow in your action plan. Watch for things like being short of breath, having chest tightness, coughing, and wheezing. Also notice if symptoms wake you up at night or if you get tired quickly when you exercise. · If you have a peak flow meter, use it to check how well you are breathing. It can help you know when an asthma attack is going to occur. Then you can take medicine to prevent the asthma attack or make it less severe. · Do not smoke or allow others to smoke around you. Avoid smoky places. Smoking makes asthma worse. If you need help quitting, talk to your doctor about stop-smoking programs and medicines. These can increase your chances of quitting for good. · Avoid colds and the flu. Get a pneumococcal vaccine shot. If you have had one before, ask your doctor whether you need a second dose.  Get a flu vaccine every year, as soon as it's available. If you must be around people with colds or the flu, wash your hands often. To treat attacks when they occur Use your asthma action plan when you have an attack. Your quick-relief medicine will stop an asthma attack. It relaxes the muscles that get tight around the airways. If your doctor prescribed corticosteroid pills to use during an attack, take them as directed. They may take hours to work, but they may shorten the attack and help you breathe better. · Albuterol is an effective quick-relief inhaler. · Take your quick-relief medicine exactly as prescribed. · Always bring your asthma medicine with you when you travel. · You may need to use quick-relief medicine before you exercise. · Call your doctor if you think you are having a problem with your medicine. When should you call for help? Call 911 anytime you think you may need emergency care. For example, call if: 
· You are having severe trouble breathing. Call your doctor now or seek immediate medical care if: 
· Your symptoms do not get better after you have followed your asthma action plan. · You cough up yellow, dark brown, or bloody mucus (sputum). Watch closely for changes in your health, and be sure to contact your doctor if: 
· Your coughing and wheezing get worse. · You need to use your quick-relief medicine on more than 2 days a week (unless it is just for exercise). · You need help figuring out what is triggering your asthma attacks. Where can you learn more? Go to http://sterling-bharti.info/. Enter N109 in the search box to learn more about \"Controlling Your Asthma: Care Instructions. \" Current as of: March 25, 2017 Content Version: 11.3 © 6659-5913 [employer]. Care instructions adapted under license by [employer] (which disclaims liability or warranty for this information).  If you have questions about a medical condition or this instruction, always ask your healthcare professional. Tuan Quarles any warranty or liability for your use of this information. Learning About the [address] Diet What is the Mediterranean diet? The Mediterranean diet is a style of eating rather than a diet plan. It features foods eaten in [place], [place], [place] and [place], and other countries along the [facility]. It emphasizes eating foods like fish, fruits, vegetables, beans, high-fiber breads and whole grains, nuts, and olive oil. This style of eating includes limited red meat, cheese, and sweets. Why choose the Mediterranean diet? A Mediterranean-style diet may improve heart health. It contains more fat than other heart-healthy diets. But the fats are mainly from nuts, unsaturated oils (such as fish oils and olive oil), and certain nut or seed oils (such as canola, soybean, or flaxseed oil). These fats may help protect the heart and blood vessels. How can you get started on the Mediterranean diet? Here are some things you can do to switch to a more Mediterranean way of eating. What to eat · Eat a variety of fruits and vegetables each day, such as grapes, blueberries, tomatoes, broccoli, peppers, figs, olives, spinach, eggplant, beans, lentils, and chickpeas. · Eat a variety of whole-grain foods each day, such as oats, brown rice, and whole wheat bread, pasta, and couscous. · Eat fish at least 2 times a week. Try tuna, salmon, mackerel, lake trout, herring, or sardines. · Eat moderate amounts of low-fat dairy products, such as milk, cheese, or yogurt. · Eat moderate amounts of poultry and eggs. · Choose healthy (unsaturated) fats, such as nuts, olive oil, and certain nut or seed oils like canola, soybean, and flaxseed. · Limit unhealthy (saturated) fats, such as butter, palm oil, and coconut oil.  And limit fats found in animal products, such as meat and dairy products made with whole milk. Try to eat red meat only a few times a month in very small amounts. · Limit sweets and desserts to only a few times a week. This includes sugar-sweetened drinks like soda. The Mediterranean diet may also include red wine with your meal1 glass each day for women and up to 2 glasses a day for men. Tips for eating at home · Use herbs, spices, garlic, lemon zest, and citrus juice instead of salt to add flavor to foods. · Add avocado slices to your sandwich instead of ríos. · Have fish for lunch or dinner instead of red meat. Brush the fish with olive oil, and broil or grill it. · Sprinkle your salad with seeds or nuts instead of cheese. · Cook with olive or canola oil instead of butter or oils that are high in saturated fat. · Switch from 2% milk or whole milk to 1% or fat-free milk. · Dip raw vegetables in a vinaigrette dressing or hummus instead of dips made from mayonnaise or sour cream. 
· Have a piece of fruit for dessert instead of a piece of cake. Try baked apples, or have some dried fruit. Tips for eating out · Try broiled, grilled, baked, or poached fish instead of having it fried or breaded. · Ask your  to have your meals prepared with olive oil instead of butter. · Order dishes made with marinara sauce or sauces made from olive oil. Avoid sauces made from cream or mayonnaise. · Choose whole-grain breads, whole wheat pasta and pizza crust, brown rice, beans, and lentils. · Cut back on butter or margarine on bread. Instead, you can dip your bread in a small amount of olive oil. · Ask for a side salad or grilled vegetables instead of french fries or chips. Where can you learn more? Go to http://sterling-bharti.info/. Enter 198-828-1990 in the search box to learn more about \"Learning About the Mediterranean Diet. \" Current as of: December 29, 2016 Content Version: 11.3 © 8212-9133 [employer].  Care instructions adapted under license by Ciarra Paul (which disclaims liability or warranty for this information). If you have questions about a medical condition or this instruction, always ask your healthcare professional. [address] any warranty or liability for your use of this information. You would benefit from more adequate rest. Please be sure to remove yourself from stimulating activities, like using your cellphone, tablet, or watching T.V. About 2 hours before bedtime. Instead engage in relaxing activities, like reading, yoga, or listening to calming music. Also try to refrain from using these devices in your sleep space or bed, as this trains your brain to stay awake in the bed versus sleeping. Avoid caffeine containing products like coffee, tea, chocolate, and soda within 2 hours of bedtime and try over the counter meds as needed to help like ZZZquil, benadryl, UNISOM or melatonin (they are non-habit forming). Introducing [facility] & HE[facility] SERVICES! Russ Greenwood introduces [email or web address] patient portal. Now you can access parts of your medical record, email your doctor's office, and request medication refills online. 1. In your internet browser, go to [email or web address] 2. Click on the First Time User? Click Here link in the Sign In box. You will see the New Member Sign Up page. 3. Enter your [email or web address] Access Code exactly as it appears below. You will not need to use this code after youve completed the sign-up process. If you do not sign up before the expiration date, you must request a new code. · [email or web address] Access Code: [email or web address] Expires: 1/22/2018  1:15 PM 
 
4. Enter the last four digits of your Social Security Number (xxxx) and Date of Birth (mm/dd/yyyy) as indicated and click Submit. You will be taken to the next sign-up page. 5. Create a [email or web address] ID. This will be your [email or web address] login ID and cannot be changed, so think of one that is secure and easy to remember. 6. Create a [employer] password. You can change your password at any time. 7. Enter your Password Reset Question and Answer. This can be used at a later time if you forget your password. 8. Enter your e-mail address. You will receive e-mail notification when new information is available in [address] 9. Click Sign Up. You can now view and download portions of your medical record. 10. Click the Download Summary menu link to download a portable copy of your medical information. If you have questions, please visit the Frequently Asked Questions section of the [employer] website. Remember, [employer] is NOT to be used for urgent needs. For medical emergencies, dial 911. Now available from your iPhone and Android! Please provide this summary of care documentation to your next provider. Your primary care clinician is listed as GEORGES De La Cruz. If you have any questions after today's visit, please call [phone].

## 2017-11-01 ENCOUNTER — CLINICAL SUPPORT (OUTPATIENT)
Dept: INTERNAL MEDICINE CLINIC | Age: 52
End: 2017-11-01

## 2017-11-01 VITALS — DIASTOLIC BLOOD PRESSURE: 91 MMHG | SYSTOLIC BLOOD PRESSURE: 157 MMHG

## 2017-11-01 DIAGNOSIS — I10 ESSENTIAL HYPERTENSION: Primary | ICD-10-CM

## 2017-11-01 NOTE — MR AVS SNAPSHOT
Visit Information Date & Time Provider Department Dept. Phone Encounter #  
 11/1/2017 11:20 AM Dallin Barillas NP [address] [facility] [phone] [id number] Follow-up Instructions Return in about 3 weeks (around 11/20/2017) for HTN med change f/u. Your Appointments 1/24/2018  1:20 PM  
ROUTINE CARE with Dallin Barillas NP  
[address] [facility] [address] [facility]) Appt Note: htn and dm [address] [facility] [address]  
[phone]  
  
   
 [address] [facility] Upcoming Health Maintenance Date Due FOBT Q 1 YEAR AGE 50-75 12/22/2017 EYE EXAM RETINAL OR DILATED Q1 11/23/2017* INFLUENZA AGE 9 TO ADULT 11/23/2017* FOOT EXAM Q1 3/22/2018 MICROALBUMIN Q1 3/22/2018 HEMOGLOBIN A1C Q6M 4/24/2018 LIPID PANEL Q1 10/24/2018 DTaP/Tdap/Td series (2 - Td) 12/22/2026 *Topic was postponed. The date shown is not the original due date. Allergies as of 11/1/2017  Review Complete On: 10/24/2017 By: Ramsey Gannon. Bosher, LPN No Known Allergies Current Immunizations  Reviewed on 12/22/2016 Name Date Influenza Vaccine (Quad) Intradermal PF 12/22/2016 Tdap 12/22/2016 Not reviewed this visit Vitals BP Smoking Status (!) 157/91 (BP 1 Location: Left arm, BP Patient Position: Sitting) Current Every Day Smoker Preferred Pharmacy Pharmacy Name Phone [address] [facility] [phone] Your Updated Medication List  
  
   
This list is accurate as of: 11/1/17 12:58 PM.  Always use your most recent med list.  
  
  
  
  
 albuterol 90 mcg/actuation inhaler Commonly known as:  PROVENTIL HFA, VENTOLIN HFA, PROAIR HFA Take 2 Puffs by inhalation every four (4) hours as needed for Wheezing or Shortness of Breath. atorvastatin 20 mg tablet Commonly known as:  LIPITOR Take 1 Tab by mouth nightly. Indications: mixed hyperlipidemia  
  
 cetirizine 10 mg tablet Commonly known as:  ZYRTEC Take 1 Tab by mouth daily. Indications: ALLERGIC RHINITIS Cholecalciferol (Vitamin D3) 2,000 unit Cap capsule Commonly known as:  VITAMIN D3 Take 2,000 Units by mouth two (2) times a day. Indications: VITAMIN D DEFICIENCY (HIGH DOSE THERAPY)  
  
 losartan-hydroCHLOROthiazide 50-12.5 mg per tablet Commonly known as:  HYZAAR Take 1 Tab by mouth daily. To replace LOSARTAN 100mg  
  
 predniSONE 20 mg tablet Commonly known as:  Wilfridoa Moment Take 2 Tabs by mouth daily (with breakfast). Follow-up Instructions Return in about 3 weeks (around 11/20/2017) for HTN med change f/u. Introducing [facility]! [employer] introduces Peak patient portal. Now you can access parts of your medical record, email your doctor's office, and request medication refills online. 1. In your internet browser, go to [email or web address] 2. Click on the First Time User? Click Here link in the Sign In box. You will see the New Member Sign Up page. 3. Enter your Peak Access Code exactly as it appears below. You will not need to use this code after youve completed the sign-up process. If you do not sign up before the expiration date, you must request a new code. · Peak Access Code: [email or web address] Expires: 1/22/2018  1:15 PM 
 
4. Enter the last four digits of your Social Security Number (xxxx) and Date of Birth (mm/dd/yyyy) as indicated and click Submit. You will be taken to the next sign-up page. 5. Create a Peak ID. This will be your Peak login ID and cannot be changed, so think of one that is secure and easy to remember. 6. Create a Peak password. You can change your password at any time. 7. Enter your Password Reset Question and Answer.  This can be used at a later time if you forget your password. 8. Enter your e-mail address. You will receive e-mail notification when new information is available in [address] 9. Click Sign Up. You can now view and download portions of your medical record. 10. Click the Download Summary menu link to download a portable copy of your medical information. If you have questions, please visit the Frequently Asked Questions section of the [employer] website. Remember, [employer] is NOT to be used for urgent needs. For medical emergencies, dial 911. Now available from your iPhone and Android! Please provide this summary of care documentation to your next provider. Your primary care clinician is listed as GEORGES Escalante. If you have any questions after today's visit, please call [phone].

## 2017-11-29 ENCOUNTER — OFFICE VISIT (OUTPATIENT)
Dept: INTERNAL MEDICINE CLINIC | Age: 52
End: 2017-11-29

## 2017-11-29 VITALS
TEMPERATURE: 98.8 F | SYSTOLIC BLOOD PRESSURE: 158 MMHG | DIASTOLIC BLOOD PRESSURE: 99 MMHG | RESPIRATION RATE: 18 BRPM | BODY MASS INDEX: 29.17 KG/M2 | HEIGHT: 62 IN | OXYGEN SATURATION: 100 % | HEART RATE: 79 BPM | WEIGHT: 158.5 LBS

## 2017-11-29 DIAGNOSIS — J45.21 MILD INTERMITTENT ASTHMA WITH ACUTE EXACERBATION: ICD-10-CM

## 2017-11-29 DIAGNOSIS — I10 ESSENTIAL HYPERTENSION: ICD-10-CM

## 2017-11-29 DIAGNOSIS — J30.89 PERENNIAL ALLERGIC RHINITIS: ICD-10-CM

## 2017-11-29 DIAGNOSIS — E11.9 TYPE 2 DIABETES MELLITUS WITHOUT COMPLICATION, WITHOUT LONG-TERM CURRENT USE OF INSULIN (HCC): Primary | ICD-10-CM

## 2017-11-29 LAB — GLUCOSE POC: 289 MG/DL

## 2017-11-29 RX ORDER — LOSARTAN POTASSIUM AND HYDROCHLOROTHIAZIDE 12.5; 5 MG/1; MG/1
1 TABLET ORAL DAILY
Qty: 30 TAB | Refills: 11
Start: 2017-11-29 | End: 2017-12-06 | Stop reason: SINTOL

## 2017-11-29 RX ORDER — METFORMIN HYDROCHLORIDE 500 MG/1
500 TABLET ORAL 2 TIMES DAILY WITH MEALS
Qty: 60 TAB | Refills: 11
Start: 2017-11-29 | End: 2018-02-27 | Stop reason: SDUPTHER

## 2017-11-29 RX ORDER — BENZONATATE 200 MG/1
200 CAPSULE ORAL
Qty: 21 CAP | Refills: 1 | Status: SHIPPED | OUTPATIENT
Start: 2017-11-29 | End: 2017-12-06

## 2017-11-29 NOTE — MR AVS SNAPSHOT
Visit Information Date & Time Provider Department Dept. Phone Encounter #  
 11/29/2017 11:20 AM Linda Almaguer NP [address] [facility] [phone] [id number] Follow-up Instructions Return in about 1 week (around 12/6/2017) for BP check. keep appt for Jayjay.  
  
Your Appointments 1/24/2018  1:20 PM  
ROUTINE CARE with Linda Almaguer NP  
[address] [facility] [address]) Appt Note: htn and dm [facility] [phone]  
[phone]  
  
   
 [address] [facility] Upcoming Health Maintenance Date Due  
 EYE EXAM RETINAL OR DILATED Q1 3/15/1974 Influenza Age 5 to Adult 8/1/2017 FOBT Q 1 YEAR AGE 50-75 12/22/2017 FOOT EXAM Q1 3/22/2018 MICROALBUMIN Q1 3/22/2018 HEMOGLOBIN A1C Q6M 4/24/2018 LIPID PANEL Q1 10/24/2018 DTaP/Tdap/Td series (2 - Td) 12/22/2026 Allergies as of 11/29/2017  Review Complete On: 11/29/2017 By: Rm Herrera. Bosher, LPN No Known Allergies Current Immunizations  Reviewed on 12/22/2016 Name Date Influenza Vaccine (Quad) Intradermal PF 12/22/2016 Tdap 12/22/2016 Not reviewed this visit You Were Diagnosed With   
  
 Codes Comments Type 2 diabetes mellitus without complication, without long-term current use of insulin (HCC)    -  Primary ICD-10-CM: E11.9 ICD-9-CM: 250.00 Essential hypertension     ICD-10-CM: I10 
ICD-9-CM: 401.9 Perennial allergic rhinitis     ICD-10-CM: J30.89 ICD-9-CM: 477.8 Vitals BP Pulse Temp Resp Height(growth percentile) Weight(growth percentile) (!) 158/99 (BP 1 Location: Right arm, BP Patient Position: Sitting) 79 98.8 °F (37.1 °C) (Oral) 18 5' 2\" (1.575 m) 158 lb 8 oz (71.9 kg) SpO2 BMI Smoking Status 100% 28.99 kg/m2 Current Every Day Smoker Vitals History BMI and BSA Data Body Mass Index Body Surface Area  
 28.99 kg/m 2 1.77 m 2 Preferred Pharmacy Pharmacy Name Phone [facility] [address], [phone] [facility] [phone] Your Updated Medication List  
  
   
This list is accurate as of: 11/29/17 12:44 PM.  Always use your most recent med list.  
  
  
  
  
 albuterol 90 mcg/actuation inhaler Commonly known as:  PROVENTIL HFA, VENTOLIN HFA, PROAIR HFA Take 2 Puffs by inhalation every four (4) hours as needed for Wheezing or Shortness of Breath. atorvastatin 20 mg tablet Commonly known as:  LIPITOR Take 1 Tab by mouth nightly. Indications: mixed hyperlipidemia  
  
 cetirizine 10 mg tablet Commonly known as:  ZYRTEC Take 1 Tab by mouth daily. Indications: ALLERGIC RHINITIS Cholecalciferol (Vitamin D3) 2,000 unit Cap capsule Commonly known as:  VITAMIN D3 Take 2,000 Units by mouth two (2) times a day. Indications: VITAMIN D DEFICIENCY (HIGH DOSE THERAPY)  
  
 losartan-hydroCHLOROthiazide 50-12.5 mg per tablet Commonly known as:  HYZAAR Take 1 Tab by mouth daily. To replace LOSARTAN 100mg We Performed the Following AMB POC GLUCOSE BLOOD, BY GLUCOSE MONITORING DEVICE [24900 CPT(R)] Follow-up Instructions Return in about 1 week (around 12/6/2017) for BP check. keep appt for Jan.  
  
  
Patient Instructions Montelukast (By mouth) Montelukast (rvo-tq-SGY-kast) Prevents asthma attacks and treats allergies. Brand Name(s): Singulair There may be other brand names for this medicine. When This Medicine Should Not Be Used: This medicine is not right for everyone. Do not use it if you had an allergic reaction to montelukast. 
How to Use This Medicine:  
Packet, Tablet, Chewable Tablet · Your doctor will tell you how much medicine to use. Do not use more than directed. · Read and follow the patient instructions that come with this medicine. Talk to your doctor or pharmacist if you have any questions. · Oral granules: Do not open the packet until you are ready to use it. You can give the oral granules in one of three ways. ¨ Put the oral granules directly on a spoon, then into the person's mouth. ¨ Mix the granules with baby formula or breast milk that is cold or room temperature. Do not mix the granules with any other liquid. ¨ Mix the granules with applesauce, mashed carrots, rice, or ice cream. Do not mix the granules with any other type of soft food. ¨ If the medicine is mixed with formula, breast milk, or food, use it right away. Do not save any mixed medicine to use later. · Missed dose: Take a dose as soon as you remember. If it is almost time for your next dose, wait until then and take a regular dose. Do not take extra medicine to make up for a missed dose. · Store the medicine in the original container at room temperature, away from heat and direct light. Drugs and Foods to Avoid: Ask your doctor or pharmacist before using any other medicine, including over-the-counter medicines, vitamins, and herbal products. Warnings While Using This Medicine: · Tell your doctor if you are pregnant or breastfeeding, or if you have liver disease, have a condition called phenylketonuria (PKU), or are allergic to aspirin. · This medicine will not stop an asthma attack that has already started. Your doctor may prescribe another medicine for a sudden asthma attack. · If you use a corticosteroid medicine to control your asthma, keep using it as instructed by your doctor. · If any of your asthma medicines do not seem to be working as well as usual, call your doctor right away. Do not change your doses or stop using your medicines without asking your doctor. · This medicine may cause some people to be agitated, disoriented, irritable, or reckless. It may also cause depression or suicidal thoughts. Tell your doctor if you have any unusual thoughts or behaviors that trouble you. · This medicine may make you dizzy or drowsy. Do not drive or do anything else that could be dangerous until you know how this medicine affects you. · Keep all medicine out of the reach of children. Never share your medicine with anyone. Possible Side Effects While Using This Medicine:  
Call your doctor right away if you notice any of these side effects: · Allergic reaction: Itching or hives, swelling in your face or hands, swelling or tingling in your mouth or throat, chest tightness, trouble breathing · Blistering, peeling, red skin rash · Chest pain, or a fast, pounding, or uneven heartbeat · Numbness or tingling in the hands, arms, legs, or feet · Pain and swelling in your sinuses · Restlessness, anxiety, irritability, mood or behavior changes, or thoughts of hurting yourself or others · Sudden and severe stomach pain, nausea, vomiting, fever, lightheadedness · Unusual bleeding or bruising If you notice these less serious side effects, talk with your doctor: · Cough, sore throat, or flu symptoms · Headache If you notice other side effects that you think are caused by this medicine, tell your doctor. Call your doctor for medical advice about side effects. You may report side effects to FDA at 2-195-FDA-2008 © 2017 [address] Information is for End User's use only and may not be sold, redistributed or otherwise used for commercial purposes. The above information is an  only. It is not intended as medical advice for individual conditions or treatments. Talk to your doctor, nurse or pharmacist before following any medical regimen to see if it is safe and effective for you. Learning About Asthma Triggers What are triggers? When you have asthma, certain things can make your symptoms worse. These are called triggers. They include: · Cigarette smoke or air pollution. · Things you are allergic to, such as: 
¨ Pollen, mold, or dust mites. ¨ Pet hair, skin, or saliva. · Illnesses, like colds, flu, or pneumonia. · Exercise. · Dry, cold air. How do triggers affect asthma? Triggers can make it harder for your lungs to work as they should and can lead to sudden difficulty breathing and other symptoms. When you are around a trigger, an asthma attack is more likely. If your symptoms are severe, you may need emergency treatment or have to go to the hospital for treatment. If you know what your triggers are and can avoid them, you may be able to prevent asthma attacks, reduce how often you have them, and make them less severe. What can you do to avoid triggers? The first thing is to know your triggers. When you are having symptoms, note the things around you that might be causing them. Then look for patterns in what may be triggering your symptoms. Record your triggers on a piece of paper or in an asthma diary. When you have your list of possible triggers, work with your doctor to find ways to avoid them. You also can check how well your lungs are working by measuring your peak expiratory flow (PEF) throughout the day. Your PEF may drop when you are near things that trigger symptoms. Here are some ways to avoid a few common triggers. · Do not smoke or allow others to smoke around you. If you need help quitting, talk to your doctor about stop-smoking programs and medicines. These can increase your chances of quitting for good. · If there is a lot of pollution, pollen, or dust outside, stay at home and keep your windows closed. Use an air conditioner or air filter in your home. Check your local weather report or newspaper for air quality and pollen reports. · Get a flu shot every year. Talk to your doctor about getting a pneumococcal shot. Wash your hands often to prevent infections. · Avoid exercising outdoors in cold weather. If you are outdoors in cold weather, wear a scarf around your face and breathe through your nose. How can you manage an asthma attack? · If you have an asthma action plan, follow the plan. In general: ¨ Use your quick-relief inhaler as directed by your doctor. If your symptoms do not get better after you use your medicine, have someone take you to the emergency room. Call an ambulance if needed. ¨ If your doctor has given you other inhaled medicines or steroid pills, take them as directed. Where can you learn more? Go to [employer].be Enter O340 in the search box to learn more about \"Learning About Asthma Triggers. \"  
© 8081-4099 Healthwise, Incorporated. Care instructions adapted under license by Tameka Fletcher (which disclaims liability or warranty for this information). This care instruction is for use with your licensed healthcare professional. If you have questions about a medical condition or this instruction, always ask your healthcare professional. [address] any warranty or liability for your use of this information. Content Version: 45.4.202678; Last Revised: February 23, 2012 Introducing [facility]! Tameka Fletcher introduces Postabon patient portal. Now you can access parts of your medical record, email your doctor's office, and request medication refills online. 1. In your internet browser, go to [email or web address] 2. Click on the First Time User? Click Here link in the Sign In box. You will see the New Member Sign Up page. 3. Enter your Postabon Access Code exactly as it appears below. You will not need to use this code after youve completed the sign-up process. If you do not sign up before the expiration date, you must request a new code. · Postabon Access Code: [email or web address] Expires: 1/22/2018 12:15 PM 
 
4. Enter the last four digits of your Social Security Number (xxxx) and Date of Birth (mm/dd/yyyy) as indicated and click Submit. You will be taken to the next sign-up page. 5. Create a Kickplay ID. This will be your Kickplay login ID and cannot be changed, so think of one that is secure and easy to remember. 6. Create a Kickplay password. You can change your password at any time. 7. Enter your Password Reset Question and Answer. This can be used at a later time if you forget your password. 8. Enter your e-mail address. You will receive e-mail notification when new information is available in [address] 9. Click Sign Up. You can now view and download portions of your medical record. 10. Click the Download Summary menu link to download a portable copy of your medical information. If you have questions, please visit the Frequently Asked Questions section of the Kickplay website. Remember, Kickplay is NOT to be used for urgent needs. For medical emergencies, dial 911. Now available from your iPhone and Android! Please provide this summary of care documentation to your next provider. Your primary care clinician is listed as GEORGES Parisi. If you have any questions after today's visit, please call [phone].

## 2017-11-29 NOTE — PROGRESS NOTES
Pt is here for   Chief Complaint   Patient presents with    Allergies     pt states zyrtec isn't helping    Diabetes     follow up    Weight Loss     pt states that he's concerned about weight loss     Pt denies pain at this time     1. Have you been to the ER, urgent care clinic since your last visit? Hospitalized since your last visit? No    2. Have you seen or consulted any other health care providers outside of the [facility] since your last visit? Include any pap smears or colon screening.  No    Verbal order given for POCT per Bonnye Dakins, FNP-C

## 2017-11-29 NOTE — PATIENT INSTRUCTIONS
Montelukast (By mouth)   Montelukast (uwx-br-GQD-kast)  Prevents asthma attacks and treats allergies. Brand Name(s): Singulair   There may be other brand names for this medicine. When This Medicine Should Not Be Used: This medicine is not right for everyone. Do not use it if you had an allergic reaction to montelukast.  How to Use This Medicine:   Packet, Tablet, Chewable Tablet  · Your doctor will tell you how much medicine to use. Do not use more than directed. · Read and follow the patient instructions that come with this medicine. Talk to your doctor or pharmacist if you have any questions. · Oral granules: Do not open the packet until you are ready to use it. You can give the oral granules in one of three ways. ¨ Put the oral granules directly on a spoon, then into the person's mouth. ¨ Mix the granules with baby formula or breast milk that is cold or room temperature. Do not mix the granules with any other liquid. ¨ Mix the granules with applesauce, mashed carrots, rice, or ice cream. Do not mix the granules with any other type of soft food. ¨ If the medicine is mixed with formula, breast milk, or food, use it right away. Do not save any mixed medicine to use later. · Missed dose: Take a dose as soon as you remember. If it is almost time for your next dose, wait until then and take a regular dose. Do not take extra medicine to make up for a missed dose. · Store the medicine in the original container at room temperature, away from heat and direct light. Drugs and Foods to Avoid:      Ask your doctor or pharmacist before using any other medicine, including over-the-counter medicines, vitamins, and herbal products. Warnings While Using This Medicine:   · Tell your doctor if you are pregnant or breastfeeding, or if you have liver disease, have a condition called phenylketonuria (PKU), or are allergic to aspirin. · This medicine will not stop an asthma attack that has already started.  Your doctor may prescribe another medicine for a sudden asthma attack. · If you use a corticosteroid medicine to control your asthma, keep using it as instructed by your doctor. · If any of your asthma medicines do not seem to be working as well as usual, call your doctor right away. Do not change your doses or stop using your medicines without asking your doctor. · This medicine may cause some people to be agitated, disoriented, irritable, or reckless. It may also cause depression or suicidal thoughts. Tell your doctor if you have any unusual thoughts or behaviors that trouble you. · This medicine may make you dizzy or drowsy. Do not drive or do anything else that could be dangerous until you know how this medicine affects you. · Keep all medicine out of the reach of children. Never share your medicine with anyone. Possible Side Effects While Using This Medicine:   Call your doctor right away if you notice any of these side effects:  · Allergic reaction: Itching or hives, swelling in your face or hands, swelling or tingling in your mouth or throat, chest tightness, trouble breathing  · Blistering, peeling, red skin rash  · Chest pain, or a fast, pounding, or uneven heartbeat  · Numbness or tingling in the hands, arms, legs, or feet  · Pain and swelling in your sinuses  · Restlessness, anxiety, irritability, mood or behavior changes, or thoughts of hurting yourself or others  · Sudden and severe stomach pain, nausea, vomiting, fever, lightheadedness  · Unusual bleeding or bruising  If you notice these less serious side effects, talk with your doctor:   · Cough, sore throat, or flu symptoms  · Headache  If you notice other side effects that you think are caused by this medicine, tell your doctor. Call your doctor for medical advice about side effects.  You may report side effects to FDA at 2-963-FDA-4908  © 2017 [address] Information is for End User's use only and may not be sold, redistributed or otherwise used for commercial purposes. The above information is an  only. It is not intended as medical advice for individual conditions or treatments. Talk to your doctor, nurse or pharmacist before following any medical regimen to see if it is safe and effective for you. Learning About Asthma Triggers  What are triggers? When you have asthma, certain things can make your symptoms worse. These are called triggers. They include:  · Cigarette smoke or air pollution. · Things you are allergic to, such as:  ¨ Pollen, mold, or dust mites. ¨ Pet hair, skin, or saliva. · Illnesses, like colds, flu, or pneumonia. · Exercise. · Dry, cold air. How do triggers affect asthma? Triggers can make it harder for your lungs to work as they should and can lead to sudden difficulty breathing and other symptoms. When you are around a trigger, an asthma attack is more likely. If your symptoms are severe, you may need emergency treatment or have to go to the hospital for treatment. If you know what your triggers are and can avoid them, you may be able to prevent asthma attacks, reduce how often you have them, and make them less severe. What can you do to avoid triggers? The first thing is to know your triggers. When you are having symptoms, note the things around you that might be causing them. Then look for patterns in what may be triggering your symptoms. Record your triggers on a piece of paper or in an asthma diary. When you have your list of possible triggers, work with your doctor to find ways to avoid them. You also can check how well your lungs are working by measuring your peak expiratory flow (PEF) throughout the day. Your PEF may drop when you are near things that trigger symptoms. Here are some ways to avoid a few common triggers. · Do not smoke or allow others to smoke around you. If you need help quitting, talk to your doctor about stop-smoking programs and medicines.  These can increase your chances of quitting for good. · If there is a lot of pollution, pollen, or dust outside, stay at home and keep your windows closed. Use an air conditioner or air filter in your home. Check your local weather report or newspaper for air quality and pollen reports. · Get a flu shot every year. Talk to your doctor about getting a pneumococcal shot. Wash your hands often to prevent infections. · Avoid exercising outdoors in cold weather. If you are outdoors in cold weather, wear a scarf around your face and breathe through your nose. How can you manage an asthma attack? · If you have an asthma action plan, follow the plan. In general:  ¨ Use your quick-relief inhaler as directed by your doctor. If your symptoms do not get better after you use your medicine, have someone take you to the emergency room. Call an ambulance if needed. ¨ If your doctor has given you other inhaled medicines or steroid pills, take them as directed. Where can you learn more? Go to [employer]be  Enter M564 in the search box to learn more about \"Learning About Asthma Triggers. \"   © 0420-2162 Healthwise, Incorporated. Care instructions adapted under license by Nadine Harper (which disclaims liability or warranty for this information). This care instruction is for use with your licensed healthcare professional. If you have questions about a medical condition or this instruction, always ask your healthcare professional. [address] any warranty or liability for your use of this information. Content Version: 90.6.600285;  Last Revised: February 23, 2012

## 2017-11-29 NOTE — PROGRESS NOTES
Judith Pak is a 47 y.o. male and presents with Allergies (pt states zyrtec isn't helping); Diabetes (follow up); and Weight Loss (pt states that he's concerned about weight loss)    Subjective:  Upper respiratory infection Review:  Judith Pak is a 47 y.o. male who complains of nasal congestion,sore throat, productive cough, myalgias and headache for the past few days, gradually worsening since that time. He denies a history of shortness of breath. Evaluation to date: none. Treatment to date: decongestants, antihistamines, cough suppressants, OTC products. Patient does not smoke cigarettes. Relevant PMH: No pertinent additional PMH. Also concerned about ~ 8 lb weight loss. Not trying to lose weight however. Hypertension Review:  The patient has hypertension  Diet and Lifestyle: generally does try to follow a  low sodium diet,  Exercises some. Home BP Monitoring: is measured at home. Pertinent ROS: NOT taking medications as instructed, no medication side effects noted. No TIA's, chest pain on exertion, dyspnea on exertion, or swelling of ankles. BP Readings from Last 3 Encounters:   11/29/17 (!) 158/99   11/01/17 (!) 157/91   10/24/17 (!) 165/99     Asthma Review:  The patient is being seen for follow up of asthma,  currently progressively worsening  Asthma symptoms occur: infrequently. Wheezing when present is described as mild and relieved with rescue bronchodilator. The patient reports NO use of a steroid inhaler. Frequency of use of quick-relief meds: rarely. Regimen compliance: The patient reports adherence to this regimen. Smoker.     Review of Systems  Constitutional: negative for fevers, chills, anorexia and weight loss  Respiratory:  negative for hemoptysis, dyspnea  CV:   negative for chest pain, palpitations, and lower extremity edema  GI:   negative for nausea, vomiting, diarrhea, abdominal pain, and melena  Endo:               negative for polyuria,polydipsia,polyphagia, and heat intolerance  Genitourinary: negative for frequency, urgency, dysuria, retention, and hematuria  Integument:  negative for rash, ulcerations, and pruritus  Hematologic:  negative for easy bruising and bleeding  Musculoskel: negative for arthralgias, muscle weakness,and joint pain/swelling  Neurological:  negative for headaches, dizziness, vertigo,and memory/gait problems  Behavl/Psych: negative for feelings of anxiety, depression, suicide, and mood changes    Past Medical History:   Diagnosis Date    Diabetes ([facility] [address]     Hypertension      History reviewed. No pertinent surgical history. Social History     Social History    Marital status:      Spouse name: N/A    Number of children: N/A    Years of education: N/A     Social History Main Topics    Smoking status: Current Every Day Smoker     Packs/day: 1.00    Smokeless tobacco: Never Used    Alcohol use No    Drug use: No    Sexual activity: Yes     Partners: Female     Other Topics Concern    None     Social History Narrative     History reviewed. No pertinent family history. Current Outpatient Prescriptions   Medication Sig Dispense Refill    metFORMIN (GLUCOPHAGE) 500 mg tablet Take 1 Tab by mouth two (2) times daily (with meals). Indications: type 2 diabetes mellitus 60 Tab 11    losartan-hydroCHLOROthiazide (HYZAAR) 50-12.5 mg per tablet Take 1 Tab by mouth daily. To replace LOSARTAN 100mg 30 Tab 11    benzonatate (TESSALON) 200 mg capsule Take 1 Cap by mouth three (3) times daily as needed for Cough for up to 7 days. 21 Cap 1    albuterol (PROVENTIL HFA, VENTOLIN HFA, PROAIR HFA) 90 mcg/actuation inhaler Take 2 Puffs by inhalation every four (4) hours as needed for Wheezing or Shortness of Breath. 1 Inhaler 0    atorvastatin (LIPITOR) 20 mg tablet Take 1 Tab by mouth nightly. Indications: mixed hyperlipidemia 30 Tab 11    Cholecalciferol, Vitamin D3, (VITAMIN D3) 2,000 unit cap capsule Take 2,000 Units by mouth two (2) times a day. Indications: VITAMIN D DEFICIENCY (HIGH DOSE THERAPY) 60 Cap 11    cetirizine (ZYRTEC) 10 mg tablet Take 1 Tab by mouth daily. Indications: ALLERGIC RHINITIS 30 Tab 5     No Known Allergies    Objective:  Visit Vitals    BP (!) 158/99 (BP 1 Location: Right arm, BP Patient Position: Sitting)    Pulse 79    Temp 98.8 °F (37.1 °C) (Oral)    Resp 18    Ht 5' 2\" (1.575 m)    Wt 158 lb 8 oz (71.9 kg)    SpO2 100%    BMI 28.99 kg/m2     Wt Readings from Last 3 Encounters:   11/29/17 158 lb 8 oz (71.9 kg)   10/24/17 160 lb 9.6 oz (72.8 kg)   03/22/17 166 lb 8 oz (75.5 kg)     Physical Exam:   General appearance - alert, well appearing, and in mild distress. + strong garlic odor  Mental status - A/O x 4, normal mood and affect. Neck -Supple ,normal CSP. FROM, non-tender. No significant adenopathy/thyromegaly. No JVD. Chest - Exp. wheezing on right lung fields, rhonchi in other lung fields. . Symmetric chest rise. No rales. Heart - Normal rate, regular rhythm. Normal S1, S2. No MGR or clicks. Abdomen - Soft,non-distended. Normoactive BS in all quadrants. NT, no mass or HSM. Ext- Radial, DP pulses, 2+ bilaterally. No pedal edema, clubbing, or cyanosis. No ulcerations to right 5th digit. Skin-Warm and dry. No hyperpigmentation, ulcerations, or suspicious lesions. Neuro - Normal speech, no focal findings or movement disorder. Normal strength, gait, and muscle tone. Results for orders placed or performed in visit on 11/29/17   AMB POC GLUCOSE BLOOD, BY GLUCOSE MONITORING DEVICE   Result Value Ref Range    Glucose  mg/dL     AssessmentPlan:  Increased losartan-HCT to 100-25 mg.  Medication Side Effects and Warnings were discussed with patient: yes   Patient Labs were reviewed: yes  Patient Past Records were reviewed: yes    See below for other orders   Follow-up Disposition:  Return in about 1 week (around 12/6/2017) for BP check. keep appt for Jan.      ICD-10-CM ICD-9-CM    1.  Type 2 diabetes mellitus without complication, without long-term current use of insulin (HCC) E11.9 250.00 AMB POC GLUCOSE BLOOD, BY GLUCOSE MONITORING DEVICE      metFORMIN (GLUCOPHAGE) 500 mg tablet   2. Essential hypertension I10 401.9 losartan-hydroCHLOROthiazide (HYZAAR) 50-12.5 mg per tablet   3. Perennial allergic rhinitis J30.89 477.8 benzonatate (TESSALON) 200 mg capsule     Orders Placed This Encounter    AMB POC GLUCOSE BLOOD, BY GLUCOSE MONITORING DEVICE    metFORMIN (GLUCOPHAGE) 500 mg tablet     Sig: Take 1 Tab by mouth two (2) times daily (with meals). Indications: type 2 diabetes mellitus     Dispense:  60 Tab     Refill:  11    losartan-hydroCHLOROthiazide (HYZAAR) 50-12.5 mg per tablet     Sig: Take 1 Tab by mouth daily. To replace LOSARTAN 100mg     Dispense:  30 Tab     Refill:  11    benzonatate (TESSALON) 200 mg capsule     Sig: Take 1 Cap by mouth three (3) times daily as needed for Cough for up to 7 days. Dispense:  21 Cap     Refill:  1       Maria R Cobian expressed understanding of plan. An After Visit Summary was offered/printed and given to the patient.

## 2017-12-06 ENCOUNTER — CLINICAL SUPPORT (OUTPATIENT)
Dept: INTERNAL MEDICINE CLINIC | Age: 52
End: 2017-12-06

## 2017-12-06 VITALS — SYSTOLIC BLOOD PRESSURE: 154 MMHG | DIASTOLIC BLOOD PRESSURE: 90 MMHG

## 2017-12-06 DIAGNOSIS — I10 ESSENTIAL HYPERTENSION: Primary | ICD-10-CM

## 2017-12-06 DIAGNOSIS — R05.3 PERSISTENT COUGH: ICD-10-CM

## 2017-12-06 RX ORDER — LOSARTAN POTASSIUM 50 MG/1
50 TABLET ORAL DAILY
Qty: 30 TAB | Refills: 11 | Status: SHIPPED | OUTPATIENT
Start: 2017-12-06 | End: 2018-02-27

## 2017-12-06 RX ORDER — CHLORTHALIDONE 50 MG/1
50 TABLET ORAL DAILY
Qty: 30 TAB | Refills: 11 | Status: SHIPPED | OUTPATIENT
Start: 2017-12-06 | End: 2018-02-27

## 2017-12-06 NOTE — PROGRESS NOTES
Pt is here for   Chief Complaint   Patient presents with    Blood Pressure Check     BP Readings from Last 3 Encounters:   12/06/17 154/90   11/29/17 (!) 158/99   11/01/17 (!) 157/91

## 2017-12-06 NOTE — PROGRESS NOTES
Initially planned to keep meds the same and await 2 more weeks. However pt reports increasing cough with use of 2 tabs losartan-HCT, so changing diuretic and lowering losartan to 50 mg dosing. Seemingly tolerated better. Will f/u in 2 weeks.

## 2017-12-20 ENCOUNTER — CLINICAL SUPPORT (OUTPATIENT)
Dept: INTERNAL MEDICINE CLINIC | Age: 52
End: 2017-12-20

## 2017-12-20 VITALS — HEART RATE: 90 BPM | SYSTOLIC BLOOD PRESSURE: 145 MMHG | DIASTOLIC BLOOD PRESSURE: 92 MMHG

## 2017-12-20 DIAGNOSIS — I10 ESSENTIAL HYPERTENSION: ICD-10-CM

## 2017-12-20 DIAGNOSIS — Z01.30 BP CHECK: Primary | ICD-10-CM

## 2017-12-20 NOTE — PROGRESS NOTES
Pt is instructed to keep scheduled appointment for January and to continue taking medications as prescribed per ANIA Kumar    Pt BP readings for today were  BP Readings from Last 3 Encounters:   12/20/17 (!) 145/92   12/06/17 154/90   11/29/17 (!) 158/99

## 2018-02-27 ENCOUNTER — OFFICE VISIT (OUTPATIENT)
Dept: INTERNAL MEDICINE CLINIC | Age: 53
End: 2018-02-27

## 2018-02-27 VITALS
TEMPERATURE: 97 F | HEIGHT: 67 IN | OXYGEN SATURATION: 97 % | SYSTOLIC BLOOD PRESSURE: 151 MMHG | HEART RATE: 81 BPM | RESPIRATION RATE: 18 BRPM | BODY MASS INDEX: 24.55 KG/M2 | WEIGHT: 156.4 LBS | DIASTOLIC BLOOD PRESSURE: 95 MMHG

## 2018-02-27 DIAGNOSIS — I10 ESSENTIAL HYPERTENSION: ICD-10-CM

## 2018-02-27 DIAGNOSIS — F17.210 CIGARETTE NICOTINE DEPENDENCE WITHOUT COMPLICATION: ICD-10-CM

## 2018-02-27 DIAGNOSIS — E11.9 TYPE 2 DIABETES MELLITUS WITHOUT COMPLICATION, WITHOUT LONG-TERM CURRENT USE OF INSULIN (HCC): Primary | ICD-10-CM

## 2018-02-27 DIAGNOSIS — E78.5 HYPERLIPIDEMIA, UNSPECIFIED HYPERLIPIDEMIA TYPE: ICD-10-CM

## 2018-02-27 PROBLEM — E11.21 TYPE 2 DIABETES WITH NEPHROPATHY (HCC): Status: ACTIVE | Noted: 2018-02-27

## 2018-02-27 LAB
CHOLEST SERPL-MCNC: 166 MG/DL
GLUCOSE POC: 203 MG/DL
HBA1C MFR BLD HPLC: 6.5 %
HDLC SERPL-MCNC: 56 MG/DL
LDL CHOLESTEROL POC: 110 MG/DL
TCHOL/HDL RATIO (POC): 3
TRIGL SERPL-MCNC: 266 MG/DL
VLDLC SERPL CALC-MCNC: 57 MG/DL

## 2018-02-27 RX ORDER — ATORVASTATIN CALCIUM 20 MG/1
20 TABLET, FILM COATED ORAL
Qty: 30 TAB | Refills: 11 | Status: SHIPPED | OUTPATIENT
Start: 2018-02-27 | End: 2019-03-12 | Stop reason: SDUPTHER

## 2018-02-27 RX ORDER — CLONIDINE HYDROCHLORIDE 0.1 MG/1
0.1 TABLET ORAL
Qty: 30 TAB | Refills: 2 | Status: SHIPPED | OUTPATIENT
Start: 2018-02-27 | End: 2019-03-12 | Stop reason: SDUPTHER

## 2018-02-27 RX ORDER — LOSARTAN POTASSIUM 50 MG/1
50 TABLET ORAL DAILY
Qty: 30 TAB | Refills: 11 | Status: SHIPPED | OUTPATIENT
Start: 2018-02-27 | End: 2019-03-12 | Stop reason: SDUPTHER

## 2018-02-27 RX ORDER — METFORMIN HYDROCHLORIDE 500 MG/1
500 TABLET ORAL
Qty: 60 TAB | Refills: 11 | Status: SHIPPED | OUTPATIENT
Start: 2018-02-27 | End: 2019-03-12 | Stop reason: SDUPTHER

## 2018-02-27 NOTE — PROGRESS NOTES
Benjamin Murrieta is a 47 y.o. male and presents with Diabetes (follow up); Hypertension (follow up); and Weight Loss (pt states he's losing weight and he would like to discuss medication change)    Subjective:  Hypertension Review:  The patient has hypertension  Diet and Lifestyle: generally does try to follow a  low sodium diet,  Exercises some. Home BP Monitoring: is measured at home. Pertinent ROS: taking medications as instructed, no medication side effects noted. No TIA's, chest pain on exertion, dyspnea on exertion, or swelling of ankles. BP Readings from Last 3 Encounters:   02/27/18 (!) 151/95   12/20/17 (!) 145/92   12/06/17 154/90     Asthma Review:  The patient is being seen for follow up of asthma,  currently progressively worsening  Asthma symptoms occur: infrequently. Wheezing when present is described as mild and relieved with rescue bronchodilator. The patient reports NO use of a steroid inhaler. Frequency of use of quick-relief meds: rarely. Regimen compliance: The patient reports adherence to this regimen. Smoker. Dyslipidemia Review:  Patient presents for evaluation of lipids. Compliance with treatment thus far has been fair. Denies myalgias, slurring speech, unilateral weakness, and chest pain. A repeat fasting lipid profile was done/ordered. The patient does use medications that may worsen dyslipidemias (corticosteroids, progestins, anabolic steroids, diuretics, beta-blockers, amiodarone, cyclosporine, olanzapine). The patient does NOT exercise regularly. The patient is NOT known to have coexisting coronary artery disease. NOT taking Aspirin daily as prescribed/advised    Also concerned about ~ 10-12 lb weight loss. Not trying to lose weight however. Stopped taking losartan due to this and recalls taking a med in [place] without these side effects, but unable to recall the name only that is was 50 mg. Smoking cessation Review:  Patient presents to discuss smoking cessation.  The patient currently is smoking 1.5  Ppd with stress from working 7 days weekly for 12 hours. He has smoked for multiple years (~30). He has tried to quit multiple times in the past using patches, but not helpful. Would like to quit by taking something to help. Review of Systems  Constitutional: negative for fevers, chills, anorexia and weight loss  Respiratory:  negative for hemoptysis, dyspnea  CV:   negative for chest pain, palpitations, and lower extremity edema  GI:   negative for nausea, vomiting, diarrhea, abdominal pain, and melena  Endo:               negative for polyuria,polydipsia,polyphagia, and heat intolerance  Genitourinary: negative for frequency, urgency, dysuria, retention, and hematuria  Integument:  negative for rash, ulcerations, and pruritus  Hematologic:  negative for easy bruising and bleeding  Musculoskel: negative for arthralgias, muscle weakness,and joint pain/swelling  Neurological:  negative for headaches, dizziness, vertigo,and memory/gait problems  Behavl/Psych: negative for feelings of anxiety, depression, suicide, and mood changes    Past Medical History:   Diagnosis Date    [facility]     Hypertension      History reviewed. No pertinent surgical history. Social History     Social History    Marital status:      Spouse name: N/A    Number of children: N/A    Years of education: N/A     Social History Main Topics    Smoking status: Current Every Day Smoker     Packs/day: 1.00    Smokeless tobacco: Never Used    Alcohol use No    Drug use: No    Sexual activity: Yes     Partners: Female     Other Topics Concern    None     Social History Narrative     History reviewed. No pertinent family history. Current Outpatient Prescriptions   Medication Sig Dispense Refill    atorvastatin (LIPITOR) 20 mg tablet Take 1 Tab by mouth nightly. Indications: mixed hyperlipidemia 30 Tab 11    losartan (COZAAR) 50 mg tablet Take 1 Tab by mouth daily.  Indications: hypertension 30 Tab 11    cloNIDine HCl (CATAPRES) 0.1 mg tablet Take 1 Tab by mouth nightly. Indications: Smoking Cessation 30 Tab 2    metFORMIN (GLUCOPHAGE) 500 mg tablet Take 1 Tab by mouth daily (with breakfast). Indications: type 2 diabetes mellitus 60 Tab 11    albuterol (PROVENTIL HFA, VENTOLIN HFA, PROAIR HFA) 90 mcg/actuation inhaler Take 2 Puffs by inhalation every four (4) hours as needed for Wheezing or Shortness of Breath. 1 Inhaler 0     No Known Allergies    Objective:  Visit Vitals    BP (!) 151/95 (BP 1 Location: Right arm, BP Patient Position: Sitting)    Pulse 81    Temp 97 °F (36.1 °C) (Oral)    Resp 18    Ht 5' 7\" (1.702 m)    Wt 156 lb 6.4 oz (70.9 kg)    SpO2 97%    BMI 24.5 kg/m2     Wt Readings from Last 3 Encounters:   02/27/18 156 lb 6.4 oz (70.9 kg)   11/29/17 158 lb 8 oz (71.9 kg)   10/24/17 160 lb 9.6 oz (72.8 kg)     Physical Exam:   General appearance - alert, well appearing, and in no distress. Mental status - A/O x 4, normal mood and affect. Neck -Supple ,normal CSP. FROM, non-tender. No significant adenopathy/thyromegaly. No JVD. Chest -CTA. Symmetric chest rise. No rales, wheezing, or rhonchi. Heart - Normal rate, regular rhythm. Normal S1, S2. No MGR or clicks. Abdomen - Soft,non-distended. Normoactive BS in all quadrants. NT, no mass or HSM. Ext- Radial, DP pulses, 2+ bilaterally. No pedal edema, clubbing, or cyanosis. Skin-Warm and dry. No hyperpigmentation, ulcerations, or suspicious lesions. Neuro - Normal speech, no focal findings or movement disorder. Normal strength, gait, and muscle tone.      Results for orders placed or performed in visit on 02/27/18   AMB POC GLUCOSE BLOOD, BY GLUCOSE MONITORING DEVICE   Result Value Ref Range    Glucose  mg/dL   AMB POC HEMOGLOBIN A1C   Result Value Ref Range    Hemoglobin A1c (POC) 6.5 %   AMB POC LIPID PROFILE   Result Value Ref Range    Cholesterol (POC) 166     Triglycerides (POC) 266     HDL Cholesterol (POC) 56     VLDL (POC) 57 MG/DL    LDL Cholesterol (POC) 110 MG/DL    TChol/HDL Ratio (POC) 3.0      AssessmentPlan:  Resumed losartan 50 mg and STARTED Clonidine 0.1 mg QHS. Smoking cessation discussed for 3-10 minutes, pt planning to quit using Clonidine, meds today. Since pt overtly concerned about wt loss, REDUCED metformin dose from BID to once daily. [address] encouraged. Medication Side Effects and Warnings were discussed with patient: yes   Patient Labs were reviewed: yes  Patient Past Records were reviewed: yes    See below for other orders   Follow-up Disposition:  Return in about 4 weeks (around 3/27/2018) for HTN, smok cess. BP check in 1 week. ICD-10-CM ICD-9-CM    1. Type 2 diabetes mellitus without complication, without long-term current use of insulin (HCC) E11.9 250.00 AMB POC GLUCOSE BLOOD, BY GLUCOSE MONITORING DEVICE      AMB POC HEMOGLOBIN A1C      AMB POC LIPID PROFILE      metFORMIN (GLUCOPHAGE) 500 mg tablet   2. Hyperlipidemia, unspecified hyperlipidemia type E78.5 272.4 AMB POC GLUCOSE BLOOD, BY GLUCOSE MONITORING DEVICE      AMB POC HEMOGLOBIN A1C      AMB POC LIPID PROFILE      atorvastatin (LIPITOR) 20 mg tablet   3. Essential hypertension I10 401.9 AMB POC GLUCOSE BLOOD, BY GLUCOSE MONITORING DEVICE      AMB POC LIPID PROFILE      losartan (COZAAR) 50 mg tablet      cloNIDine HCl (CATAPRES) 0.1 mg tablet   4. Cigarette nicotine dependence without complication H76.179 377.4 cloNIDine HCl (CATAPRES) 0.1 mg tablet     Orders Placed This Encounter    AMB POC GLUCOSE BLOOD, BY GLUCOSE MONITORING DEVICE    AMB POC HEMOGLOBIN A1C    AMB POC LIPID PROFILE    atorvastatin (LIPITOR) 20 mg tablet     Sig: Take 1 Tab by mouth nightly. Indications: mixed hyperlipidemia     Dispense:  30 Tab     Refill:  11    losartan (COZAAR) 50 mg tablet     Sig: Take 1 Tab by mouth daily.  Indications: hypertension     Dispense:  30 Tab     Refill:  11    cloNIDine HCl (CATAPRES) 0.1 mg tablet Sig: Take 1 Tab by mouth nightly. Indications: Smoking Cessation     Dispense:  30 Tab     Refill:  2    metFORMIN (GLUCOPHAGE) 500 mg tablet     Sig: Take 1 Tab by mouth daily (with breakfast). Indications: type 2 diabetes mellitus     Dispense:  60 Tab     Refill:  [address] expressed understanding of plan. An After Visit Summary was offered/printed and given to the patient.

## 2018-02-27 NOTE — PROGRESS NOTES
Pt Is here for   Chief Complaint   Patient presents with    Diabetes     follow up    Hypertension     follow up    Weight Loss     pt states he's losing weight and he would like to discuss medication change     Pt denies pain at this time. 1. Have you been to the ER, urgent care clinic since your last visit? Hospitalized since your last visit? No    2. Have you seen or consulted any other health care providers outside of the [address] [place] [address] since your last visit? Include any pap smears or colon screening.  No

## 2018-02-27 NOTE — MR AVS SNAPSHOT
[address] [address] 
[phone] Patient: Olive Rodriguez MRN: [id number] HARRIET:[id number] Visit Information Date & Time Provider Department Dept. Phone Encounter #  
 2/27/2018  7:45 AM Mina Palma  [facility] [phone] [id number] Follow-up Instructions Return in about 4 weeks (around 3/27/2018) for HTN, smok cess. BP check in 1 week. Upcoming Health Maintenance Date Due  
 EYE EXAM RETINAL OR DILATED Q1 3/15/1974 FOBT Q 1 YEAR AGE 50-75 12/22/2017 FOOT EXAM Q1 3/22/2018 MICROALBUMIN Q1 3/22/2018 HEMOGLOBIN A1C Q6M 4/24/2018 LIPID PANEL Q1 10/24/2018 DTaP/Tdap/Td series (2 - Td) 12/22/2026 Allergies as of 2/27/2018  Review Complete On: 2/27/2018 By: Mina Palma NP No Known Allergies Current Immunizations  Reviewed on 12/22/2016 Name Date Influenza Vaccine (Quad) Intradermal PF 12/22/2016 Tdap 12/22/2016 Not reviewed this visit You Were Diagnosed With   
  
 Codes Comments Type 2 diabetes mellitus without complication, without long-term current use of insulin (HCC)    -  Primary ICD-10-CM: E11.9 ICD-9-CM: 250.00 Hyperlipidemia, unspecified hyperlipidemia type     ICD-10-CM: E78.5 ICD-9-CM: 272.4 Essential hypertension     ICD-10-CM: I10 
ICD-9-CM: 401.9 Cigarette nicotine dependence without complication     TDX-60-AK: F17.210 ICD-9-CM: 305.1 Vitals BP Pulse Temp Resp Height(growth percentile) Weight(growth percentile) (!) 151/95 (BP 1 Location: Right arm, BP Patient Position: Sitting) 81 97 °F (36.1 °C) (Oral) 18 5' 7\" (1.702 m) 156 lb 6.4 oz (70.9 kg) SpO2 BMI Smoking Status 97% 24.5 kg/m2 Current Every Day Smoker Vitals History BMI and BSA Data Body Mass Index Body Surface Area 24.5 kg/m 2 1.83 m 2 Preferred Pharmacy Pharmacy Name Phone [address], [address] [facility] [phone] Your Updated Medication List  
  
   
This list is accurate as of 2/27/18  9:29 AM.  Always use your most recent med list.  
  
  
  
  
 albuterol 90 mcg/actuation inhaler Commonly known as:  PROVENTIL HFA, VENTOLIN HFA, PROAIR HFA Take 2 Puffs by inhalation every four (4) hours as needed for Wheezing or Shortness of Breath. atorvastatin 20 mg tablet Commonly known as:  LIPITOR Take 1 Tab by mouth nightly. Indications: mixed hyperlipidemia  
  
 cloNIDine HCl 0.1 mg tablet Commonly known as:  CATAPRES Take 1 Tab by mouth nightly. Indications: Smoking Cessation  
  
 losartan 50 mg tablet Commonly known as:  COZAAR Take 1 Tab by mouth daily. Indications: hypertension  
  
 metFORMIN 500 mg tablet Commonly known as:  GLUCOPHAGE Take 1 Tab by mouth daily (with breakfast). Indications: type 2 diabetes mellitus Prescriptions Sent to Pharmacy Refills  
 atorvastatin (LIPITOR) 20 mg tablet 11 Sig: Take 1 Tab by mouth nightly. Indications: mixed hyperlipidemia Class: Normal  
 [facility], [address] AT [address] [place] [address] Ph #: [phone] Route: Oral  
 losartan (COZAAR) 50 mg tablet 11 Sig: Take 1 Tab by mouth daily. Indications: hypertension Class: Normal  
 [facility], [address] AT [address] [place] [address] Ph #: [phone] Route: Oral  
 cloNIDine HCl (CATAPRES) 0.1 mg tablet 2 Sig: Take 1 Tab by mouth nightly. Indications: Smoking Cessation Class: Normal  
 Pharmacy: [facility], [address] AT [address] [place] [address] Ph #: [phone] Route: Oral  
 metFORMIN (GLUCOPHAGE) 500 mg tablet 11 Sig: Take 1 Tab by mouth daily (with breakfast).  Indications: type 2 diabetes mellitus Class: Normal  
 Pharmacy: [facility] [address], [address] [place] [address] RD AT [address] [facility] #: [phone] Route: Oral  
  
We Performed the Following AMB POC GLUCOSE BLOOD, BY GLUCOSE MONITORING DEVICE [28538 CPT(R)] AMB POC HEMOGLOBIN A1C [34256 CPT(R)] AMB POC LIPID PROFILE [53300 CPT(R)] Follow-up Instructions Return in about 4 weeks (around 3/27/2018) for HTN, smok cess. BP check in 1 week. Patient Instructions Clonidine (By mouth) Clonidine (Yvonne Mend) Treats high blood pressure. Also treats ADHD. Brand Name(s): Matt Campbell There may be other brand names for this medicine. When This Medicine Should Not Be Used: This medicine is not right for everyone. Do not use it if you had an allergic reaction to clonidine. How to Use This Medicine:  
Long Acting Suspension, Tablet, Long Acting Tablet · Take your medicine as directed. Your dose may need to be changed several times to find what works best for you. · Swallow the extended-release tablet whole. Do not crush, break, or chew it. · Clonidine extended-release tablets work differently than clonidine immediate-release tablets. Do not switch from the extended-release tablets to the immediate-release tablets unless your doctor tells you to. · Measure the oral liquid medicine with a marked measuring spoon, oral syringe, or medicine cup. Shake the bottle well before each use. · Read and follow the patient instructions that come with this medicine. Talk to your doctor or pharmacist if you have any questions. · Missed dose: Take a dose as soon as you remember. If it is almost time for your next dose, wait until then and take a regular dose. Do not take extra medicine to make up for a missed dose. If you miss more than 1 dose of clonidine tablets, check with your doctor right away.  
· Store the medicine in a closed container at room temperature, away from heat, moisture, and direct light. Drugs and Foods to Avoid: Ask your doctor or pharmacist before using any other medicine, including over-the-counter medicines, vitamins, and herbal products. · Do not use this medicine together with other products that contain clonidine. · Some medicines can affect how clonidine works. Tell your doctor if you are taking depression medicine. · Tell your doctor if you use anything else that makes you sleepy. Some examples are allergy medicine, narcotic pain medicine, and alcohol. Warnings While Using This Medicine: · Tell your doctor if you are pregnant, breastfeeding, or have kidney disease, heart or blood vessel disease, heart rhythm problems, stomach or bowel problems, or a history of heart attack or stroke. · This medicine may make you drowsy, dizzy, or lightheaded. Do not drive or do anything that could be dangerous until you know how this medicine affects you. · This medicine may cause dryness of the eyes. Talk to your doctor about how to treat the dryness. · Do not stop using this medicine suddenly. Your doctor will need to slowly decrease your dose before you stop it completely. · Tell any doctor or dentist who treats you that you are using this medicine. You may need to stop using this medicine several days before you have surgery or medical tests. · Even if you feel well, do not stop using the medicine without asking your doctor first. This medicine will not cure your high blood pressure, but it will help keep it in the normal range. You may have to take blood pressure medicine for the rest of your life. · Your doctor will check your progress and the effects of this medicine at regular visits. Keep all appointments. · Keep all medicine out of the reach of children. Never share your medicine with anyone. Possible Side Effects While Using This Medicine:  
Call your doctor right away if you notice any of these side effects: · Allergic reaction: Itching or hives, swelling in your face or hands, swelling or tingling in your mouth or throat, chest tightness, trouble breathing · Fast, slow, pounding, or uneven heartbeat · Lightheadedness, dizziness, fainting · Swelling in your hands, ankles, or feet · Unusual tiredness or weakness If you notice these less serious side effects, talk with your doctor: · Constipation, stomach pain · Dry eyes, mouth, or throat · Mild skin rash · New or worsening headache If you notice other side effects that you think are caused by this medicine, tell your doctor. Call your doctor for medical advice about side effects. You may report side effects to FDA at 1-752-UNZ-5602 © 2017 [facility] Information is for End User's use only and may not be sold, redistributed or otherwise used for commercial purposes. The above information is an  only. It is not intended as medical advice for individual conditions or treatments. Talk to your doctor, nurse or pharmacist before following any medical regimen to see if it is safe and effective for you. Work-Life Balance: Care Instructions Your Care Instructions Do you ever feel like there is not enough time to do all of the things you have to do, and no time at all for the things you enjoy? If so, you are not alone. On average, people in the [place] have worked more and more hours each year since 1970. But in recent years, fewer people say they want to take on more at work, even if they would get promoted or get paid more money. More and more workers say they want time to spend with their families and to do things that are important to them. Do you ever feel: · That you always have more and more work to do at your job? · That too many people depend on you every day? · That you never have enough time for your family or friends? · That you never have time for hobbies or things you enjoy? · That each second of your day is scheduled? If you answered \"yes\" to any of these questions, take steps at work and at home to get your life into balance. Follow-up care is a key part of your treatment and safety. Be sure to make and go to all appointments, and call your doctor if you are having problems. How can you care for yourself at home? Manage your time · Focus on the important things. Taking on too much can wear you out. Look at how you spend your time, and redirect your focus. Learn to say \"no\" and let go of things that do not matter. · Set one small goal at a time. Use a day planner. Break large projects into smaller ones. · Ask for help. Let your children, your spouse, your coworkers, and other people in your life help you get things done. · Leave your job at the office. If you give up free time to get more work done, you may pay for it with stress. If your job offers a flexible work schedule, use it to fit your own work style. For instance, come in earlier to have a longer lunch break, or make time for a yoga class or workout during your workday. · Unplug. Do not let technology (such as your cell phone or the Internet) erase the line between your time and your employer's time. Lower job stress Job stress causes trouble at work and at home. At work, you may worry about things you have not had time to do at home. At home, you may worry about your job. This cycle upsets your work-life balance. Lowering your job stress can get your life back in balance. Job stress can be caused by: · Pressure and deadlines. · Heavy workloads or long hours. · Not being allowed to make decisions. · Health and safety hazards. · Feeling you may lose your job. · Unclear or changing job duties. · Too much responsibility. · Work that is very tiring or boring. Do any of these things bother you? Consider talking with your boss to change things. There are some things that you may not be able to control. But even a few small changes might help lower your stress. Take advantage of programs at work Businesses make money and are better off in other ways if their employees are healthy and happy. For this reason, many companies have programs to help balance work life and home life. These programs may include: · Flexible schedules and hours. · Time off for family reasons, education, or community service. · Being able to work from home. · Employee assistance programs to provide counseling. · Child-care programs. Check to see if your company has any of these or other programs that could help you. If not, consider talking to your boss about why work-life balance programs make good business sense. Even if your company does not start an official program, you may be able to get flexible hours, time off, or the ability to do some work from home. Know when to quit If you are truly unhappy because of a stressful job, and if the suggestions here have not worked, it may be time to think about changing jobs or changing careers. But before you quit, take time to research your options. Where can you learn more? Go to http://sterling-bharti.info/. Enter S901 in the search box to learn more about \"Work-Life Balance: Care Instructions. \" Current as of: July 26, 2016 Content Version: 11.4 © 9782-7147 Healthwise, Incorporated. Care instructions adapted under license by [employer] (which disclaims liability or warranty for this information). If you have questions about a medical condition or this instruction, always ask your healthcare professional. [place] [address] any warranty or liability for your use of this information. Learning About Benefits From Quitting Smoking How does quitting smoking make you healthier? If you're thinking about quitting smoking, you may have a few reasons to be smoke-free. Your health may be one of them. · When you quit smoking, you lower your risks for cancer, lung disease, heart attack, stroke, blood vessel disease, and blindness from macular degeneration. · When you're smoke-free, you get sick less often, and you heal faster. You are less likely to get colds, flu, bronchitis, and pneumonia. · As a nonsmoker, you may find that your mood is better and you are less stressed. When and how will you feel healthier? Quitting has real health benefits that start from day 1 of being smoke-free. And the longer you stay smoke-free, the healthier you get and the better you feel. The first hours · After just 20 minutes, your blood pressure and heart rate go down. That means there's less stress on your heart and blood vessels. · Within 12 hours, the level of carbon monoxide in your blood drops back to normal. That makes room for more oxygen. With more oxygen in your body, you may notice that you have more energy than when you smoked. After 2 weeks · Your lungs start to work better. · Your risk of heart attack starts to drop. After 1 month · When your lungs are clear, you cough less and breathe deeper, so it's easier to be active. · Your sense of taste and smell return. That means you can enjoy food more than you have since you started smoking. Over the years · After 1 year, your risk of heart disease is half what it would be if you kept smoking. · After 5 years, your risk of stroke starts to shrink. Within a few years after that, it's about the same as if you'd never smoked. · After 10 years, your risk of dying from lung cancer is cut by about half. And your risk for many other types of cancer is lower too. How would quitting help others in your life? When you quit smoking, you improve the health of everyone who now breathes in your smoke. · Their heart, lung, and cancer risks drop, much like yours. · They are sick less.  For babies and small children, living smoke-free means they're less likely to have ear infections, pneumonia, and bronchitis. · If you're a woman who is or will be pregnant someday, quitting smoking means a healthier . · Children who are close to you are less likely to become adult smokers. Where can you learn more? Go to http://sterling-bharti.info/. Enter [id number] in the search box to learn more about \"Learning About Benefits From Quitting Smoking. \" Current as of: 2017 Content Version: 11.4 © 2491-5433 [employer]. Care instructions adapted under license by [employer] (which disclaims liability or warranty for this information). If you have questions about a medical condition or this instruction, always ask your healthcare professional. [address] any warranty or liability for your use of this information. Stopping Smoking: Care Instructions Your Care Instructions Cigarette smokers crave the nicotine in cigarettes. Giving it up is much harder than simply changing a habit. Your body has to stop craving the nicotine. It is hard to quit, but you can do it. There are many tools that people use to quit smoking. You may find that combining tools works best for you. There are several steps to quitting. First you get ready to quit. Then you get support to help you. After that, you learn new skills and behaviors to become a nonsmoker. For many people, a necessary step is getting and using medicine. Your doctor will help you set up the plan that best meets your needs. You may want to attend a smoking cessation program to help you quit smoking. When you choose a program, look for one that has proven success. Ask your doctor for ideas.  You will greatly increase your chances of success if you take medicine as well as get counseling or join a cessation program. 
Some of the changes you feel when you first quit tobacco are uncomfortable. Your body will miss the nicotine at first, and you may feel short-tempered and grumpy. You may have trouble sleeping or concentrating. Medicine can help you deal with these symptoms. You may struggle with changing your smoking habits and rituals. The last step is the tricky one: Be prepared for the smoking urge to continue for a time. This is a lot to deal with, but keep at it. You will feel better. Follow-up care is a key part of your treatment and safety. Be sure to make and go to all appointments, and call your doctor if you are having problems. It's also a good idea to know your test results and keep a list of the medicines you take. How can you care for yourself at home? · Ask your family, friends, and coworkers for support. You have a better chance of quitting if you have help and support. · Join a support group, such as Nicotine Anonymous, for people who are trying to quit smoking. · Consider signing up for a smoking cessation program, such as the American Lung Association's Freedom from Smoking program. 
· Set a quit date. Pick your date carefully so that it is not right in the middle of a big deadline or stressful time. Once you quit, do not even take a puff. Get rid of all ashtrays and lighters after your last cigarette. Clean your house and your clothes so that they do not smell of smoke. · Learn how to be a nonsmoker. Think about ways you can avoid those things that make you reach for a cigarette. ¨ Avoid situations that put you at greatest risk for smoking. For some people, it is hard to have a drink with friends without smoking. For others, they might skip a coffee break with coworkers who smoke. ¨ Change your daily routine. Take a different route to work or eat a meal in a different place. · Cut down on stress. Calm yourself or release tension by doing an activity you enjoy, such as reading a book, taking a hot bath, or gardening. · Talk to your doctor or pharmacist about nicotine replacement therapy, which replaces the nicotine in your body. You still get nicotine but you do not use tobacco. Nicotine replacement products help you slowly reduce the amount of nicotine you need. These products come in several forms, many of them available over-the-counter: ¨ Nicotine patches ¨ Nicotine gum and lozenges ¨ Nicotine inhaler · Ask your doctor about bupropion (Wellbutrin) or varenicline (Chantix), which are prescription medicines. They do not contain nicotine. They help you by reducing withdrawal symptoms, such as stress and anxiety. · Some people find hypnosis, acupuncture, and massage helpful for ending the smoking habit. · Eat a healthy diet and get regular exercise. Having healthy habits will help your body move past its craving for nicotine. · Be prepared to keep trying. Most people are not successful the first few times they try to quit. Do not get mad at yourself if you smoke again. Make a list of things you learned and think about when you want to try again, such as next week, next month, or next year. Where can you learn more? Go to http://sterlingCity-dimensional network logobharti.info/. Enter P540 in the search box to learn more about \"Stopping Smoking: Care Instructions. \" Current as of: March 20, 2017 Content Version: 11.4 © 6057-0524 Healthwise, P[employer] Change [employer]. Care instructions adapted under license by [employer] (which disclaims liability or warranty for this information). If you have questions about a medical condition or this instruction, always ask your healthcare professional. [place] [address] any warranty or liability for your use of this information. Introducing hospitals & HEALTH SERVICES! Maximiliano Valdivia introduces [employer] patient portal. Now you can access parts of your medical record, email your doctor's office, and request medication refills online.    
 
1. In your internet browser, go to [email or web address] 2. Click on the First Time User? Click Here link in the Sign In box. You will see the New Member Sign Up page. 3. Enter your Veam Video Access Code exactly as it appears below. You will not need to use this code after youve completed the sign-up process. If you do not sign up before the expiration date, you must request a new code. · Veam Video Access Code: [email or web address] Expires: 4/24/2018  2:33 PM 
 
4. Enter the last four digits of your Social Security Number (xxxx) and Date of Birth (mm/dd/yyyy) as indicated and click Submit. You will be taken to the next sign-up page. 5. Create a WikiCell Designst ID. This will be your Veam Video login ID and cannot be changed, so think of one that is secure and easy to remember. 6. Create a Veam Video password. You can change your password at any time. 7. Enter your Password Reset Question and Answer. This can be used at a later time if you forget your password. 8. Enter your e-mail address. You will receive e-mail notification when new information is available in [address] 9. Click Sign Up. You can now view and download portions of your medical record. 10. Click the Download Summary menu link to download a portable copy of your medical information. If you have questions, please visit the Frequently Asked Questions section of the Veam Video website. Remember, Veam Video is NOT to be used for urgent needs. For medical emergencies, dial 911. Now available from your iPhone and Android! Please provide this summary of care documentation to your next provider. Your primary care clinician is listed as GEORGES Zacarias. If you have any questions after today's visit, please call [phone].

## 2018-02-27 NOTE — PATIENT INSTRUCTIONS
Clonidine (By mouth)   Clonidine (VTEO-oz-gnyi)  Treats high blood pressure. Also treats ADHD. Brand Name(s): Catapres, Kapvay   There may be other brand names for this medicine. When This Medicine Should Not Be Used: This medicine is not right for everyone. Do not use it if you had an allergic reaction to clonidine. How to Use This Medicine:   Long Acting Suspension, Tablet, Long Acting Tablet  · Take your medicine as directed. Your dose may need to be changed several times to find what works best for you. · Swallow the extended-release tablet whole. Do not crush, break, or chew it. · Clonidine extended-release tablets work differently than clonidine immediate-release tablets. Do not switch from the extended-release tablets to the immediate-release tablets unless your doctor tells you to. · Measure the oral liquid medicine with a marked measuring spoon, oral syringe, or medicine cup. Shake the bottle well before each use. · Read and follow the patient instructions that come with this medicine. Talk to your doctor or pharmacist if you have any questions. · Missed dose: Take a dose as soon as you remember. If it is almost time for your next dose, wait until then and take a regular dose. Do not take extra medicine to make up for a missed dose. If you miss more than 1 dose of clonidine tablets, check with your doctor right away. · Store the medicine in a closed container at room temperature, away from heat, moisture, and direct light. Drugs and Foods to Avoid:   Ask your doctor or pharmacist before using any other medicine, including over-the-counter medicines, vitamins, and herbal products. · Do not use this medicine together with other products that contain clonidine. · Some medicines can affect how clonidine works. Tell your doctor if you are taking depression medicine. · Tell your doctor if you use anything else that makes you sleepy.  Some examples are allergy medicine, narcotic pain medicine, and alcohol. Warnings While Using This Medicine:   · Tell your doctor if you are pregnant, breastfeeding, or have kidney disease, heart or blood vessel disease, heart rhythm problems, stomach or bowel problems, or a history of heart attack or stroke. · This medicine may make you drowsy, dizzy, or lightheaded. Do not drive or do anything that could be dangerous until you know how this medicine affects you. · This medicine may cause dryness of the eyes. Talk to your doctor about how to treat the dryness. · Do not stop using this medicine suddenly. Your doctor will need to slowly decrease your dose before you stop it completely. · Tell any doctor or dentist who treats you that you are using this medicine. You may need to stop using this medicine several days before you have surgery or medical tests. · Even if you feel well, do not stop using the medicine without asking your doctor first. This medicine will not cure your high blood pressure, but it will help keep it in the normal range. You may have to take blood pressure medicine for the rest of your life. · Your doctor will check your progress and the effects of this medicine at regular visits. Keep all appointments. · Keep all medicine out of the reach of children. Never share your medicine with anyone.   Possible Side Effects While Using This Medicine:   Call your doctor right away if you notice any of these side effects:  · Allergic reaction: Itching or hives, swelling in your face or hands, swelling or tingling in your mouth or throat, chest tightness, trouble breathing  · Fast, slow, pounding, or uneven heartbeat  · Lightheadedness, dizziness, fainting  · Swelling in your hands, ankles, or feet  · Unusual tiredness or weakness  If you notice these less serious side effects, talk with your doctor:   · Constipation, stomach pain  · Dry eyes, mouth, or throat  · Mild skin rash  · New or worsening headache  If you notice other side effects that you think are caused by this medicine, tell your doctor. Call your doctor for medical advice about side effects. You may report side effects to FDA at 5-187-MPO-1532  © 2017 [facility] Information is for End User's use only and may not be sold, redistributed or otherwise used for commercial purposes. The above information is an  only. It is not intended as medical advice for individual conditions or treatments. Talk to your doctor, nurse or pharmacist before following any medical regimen to see if it is safe and effective for you. Work-Life Balance: Care Instructions  Your Care Instructions    Do you ever feel like there is not enough time to do all of the things you have to do, and no time at all for the things you enjoy? If so, you are not alone. On average, people in the [place] have worked more and more hours each year since 1970. But in recent years, fewer people say they want to take on more at work, even if they would get promoted or get paid more money. More and more workers say they want time to spend with their families and to do things that are important to them. Do you ever feel:  · That you always have more and more work to do at your job? · That too many people depend on you every day? · That you never have enough time for your family or friends? · That you never have time for hobbies or things you enjoy? · That each second of your day is scheduled? If you answered \"yes\" to any of these questions, take steps at work and at home to get your life into balance. Follow-up care is a key part of your treatment and safety. Be sure to make and go to all appointments, and call your doctor if you are having problems. How can you care for yourself at home? Manage your time  · Focus on the important things. Taking on too much can wear you out. Look at how you spend your time, and redirect your focus. Learn to say \"no\" and let go of things that do not matter.   · Set one small goal at a time. Use a day planner. Break large projects into smaller ones. · Ask for help. Let your children, your spouse, your coworkers, and other people in your life help you get things done. · Leave your job at the office. If you give up free time to get more work done, you may pay for it with stress. If your job offers a flexible work schedule, use it to fit your own work style. For instance, come in earlier to have a longer lunch break, or make time for a yoga class or workout during your workday. · Unplug. Do not let technology (such as your cell phone or the Internet) erase the line between your time and your employer's time. Lower job stress  Job stress causes trouble at work and at home. At work, you may worry about things you have not had time to do at home. At home, you may worry about your job. This cycle upsets your work-life balance. Lowering your job stress can get your life back in balance. Job stress can be caused by:  · Pressure and deadlines. · Heavy workloads or long hours. · Not being allowed to make decisions. · Health and safety hazards. · Feeling you may lose your job. · Unclear or changing job duties. · Too much responsibility. · Work that is very tiring or boring. Do any of these things bother you? Consider talking with your boss to change things. There are some things that you may not be able to control. But even a few small changes might help lower your stress. Take advantage of programs at work  Businesses make money and are better off in other ways if their employees are healthy and happy. For this reason, many companies have programs to help balance work life and home life. These programs may include:  · Flexible schedules and hours. · Time off for family reasons, education, or community service. · Being able to work from home. · Employee assistance programs to provide counseling. · Child-care programs.   Check to see if your company has any of these or other programs that could help you. If not, consider talking to your boss about why work-life balance programs make good business sense. Even if your company does not start an official program, you may be able to get flexible hours, time off, or the ability to do some work from home. Know when to quit  If you are truly unhappy because of a stressful job, and if the suggestions here have not worked, it may be time to think about changing jobs or changing careers. But before you quit, take time to research your options. Where can you learn more? Go to http://sterlingPrepChampsbharti.info/. Enter U954 in the search box to learn more about \"Work-Life Balance: Care Instructions. \"  Current as of: July 26, 2016  Content Version: 11.4  © 2819-0061 [employer]. Care instructions adapted under license by [employer] (which disclaims liability or warranty for this information). If you have questions about a medical condition or this instruction, always ask your healthcare professional. [address] any warranty or liability for your use of this information. Learning About Benefits From Quitting Smoking  How does quitting smoking make you healthier? If you're thinking about quitting smoking, you may have a few reasons to be smoke-free. Your health may be one of them. · When you quit smoking, you lower your risks for cancer, lung disease, heart attack, stroke, blood vessel disease, and blindness from macular degeneration. · When you're smoke-free, you get sick less often, and you heal faster. You are less likely to get colds, flu, bronchitis, and pneumonia. · As a nonsmoker, you may find that your mood is better and you are less stressed. When and how will you feel healthier? Quitting has real health benefits that start from day 1 of being smoke-free. And the longer you stay smoke-free, the healthier you get and the better you feel.   The first hours  · After just 20 minutes, your blood pressure and heart rate go down. That means there's less stress on your heart and blood vessels. · Within 12 hours, the level of carbon monoxide in your blood drops back to normal. That makes room for more oxygen. With more oxygen in your body, you may notice that you have more energy than when you smoked. After 2 weeks  · Your lungs start to work better. · Your risk of heart attack starts to drop. After 1 month  · When your lungs are clear, you cough less and breathe deeper, so it's easier to be active. · Your sense of taste and smell return. That means you can enjoy food more than you have since you started smoking. Over the years  · After 1 year, your risk of heart disease is half what it would be if you kept smoking. · After 5 years, your risk of stroke starts to shrink. Within a few years after that, it's about the same as if you'd never smoked. · After 10 years, your risk of dying from lung cancer is cut by about half. And your risk for many other types of cancer is lower too. How would quitting help others in your life? When you quit smoking, you improve the health of everyone who now breathes in your smoke. · Their heart, lung, and cancer risks drop, much like yours. · They are sick less. For babies and small children, living smoke-free means they're less likely to have ear infections, pneumonia, and bronchitis. · If you're a woman who is or will be pregnant someday, quitting smoking means a healthier . · Children who are close to you are less likely to become adult smokers. Where can you learn more? Go to http://sterling-bharti.info/. Enter [id number] [id number] in the search box to learn more about \"Learning About Benefits From Quitting Smoking. \"  Current as of: 2017  Content Version: 11.4  © 3342-9337 [employer].  Care instructions adapted under license by [employer] (which disclaims liability or warranty for this information). If you have questions about a medical condition or this instruction, always ask your healthcare professional. [address] any warranty or liability for your use of this information. Stopping Smoking: Care Instructions  Your Care Instructions    Cigarette smokers crave the nicotine in cigarettes. Giving it up is much harder than simply changing a habit. Your body has to stop craving the nicotine. It is hard to quit, but you can do it. There are many tools that people use to quit smoking. You may find that combining tools works best for you. There are several steps to quitting. First you get ready to quit. Then you get support to help you. After that, you learn new skills and behaviors to become a nonsmoker. For many people, a necessary step is getting and using medicine. Your doctor will help you set up the plan that best meets your needs. You may want to attend a smoking cessation program to help you quit smoking. When you choose a program, look for one that has proven success. Ask your doctor for ideas. You will greatly increase your chances of success if you take medicine as well as get counseling or join a cessation program.  Some of the changes you feel when you first quit tobacco are uncomfortable. Your body will miss the nicotine at first, and you may feel short-tempered and grumpy. You may have trouble sleeping or concentrating. Medicine can help you deal with these symptoms. You may struggle with changing your smoking habits and rituals. The last step is the tricky one: Be prepared for the smoking urge to continue for a time. This is a lot to deal with, but keep at it. You will feel better. Follow-up care is a key part of your treatment and safety. Be sure to make and go to all appointments, and call your doctor if you are having problems. It's also a good idea to know your test results and keep a list of the medicines you take.   How can you care for yourself at home?  · Ask your family, friends, and coworkers for support. You have a better chance of quitting if you have help and support. · Join a support group, such as Nicotine Anonymous, for people who are trying to quit smoking. · Consider signing up for a smoking cessation program, such as the American Lung Association's Freedom from Smoking program.  · Set a quit date. Pick your date carefully so that it is not right in the middle of a big deadline or stressful time. Once you quit, do not even take a puff. Get rid of all ashtrays and lighters after your last cigarette. Clean your house and your clothes so that they do not smell of smoke. · Learn how to be a nonsmoker. Think about ways you can avoid those things that make you reach for a cigarette. ¨ Avoid situations that put you at greatest risk for smoking. For some people, it is hard to have a drink with friends without smoking. For others, they might skip a coffee break with coworkers who smoke. ¨ Change your daily routine. Take a different route to work or eat a meal in a different place. · Cut down on stress. Calm yourself or release tension by doing an activity you enjoy, such as reading a book, taking a hot bath, or gardening. · Talk to your doctor or pharmacist about nicotine replacement therapy, which replaces the nicotine in your body. You still get nicotine but you do not use tobacco. Nicotine replacement products help you slowly reduce the amount of nicotine you need. These products come in several forms, many of them available over-the-counter:  ¨ Nicotine patches  ¨ Nicotine gum and lozenges  ¨ Nicotine inhaler  · Ask your doctor about bupropion (Wellbutrin) or varenicline (Chantix), which are prescription medicines. They do not contain nicotine. They help you by reducing withdrawal symptoms, such as stress and anxiety. · Some people find hypnosis, acupuncture, and massage helpful for ending the smoking habit.   · Eat a healthy diet and get regular exercise. Having healthy habits will help your body move past its craving for nicotine. · Be prepared to keep trying. Most people are not successful the first few times they try to quit. Do not get mad at yourself if you smoke again. Make a list of things you learned and think about when you want to try again, such as next week, next month, or next year. Where can you learn more? Go to http://sterling-bharti.info/. Enter A973 in the search box to learn more about \"Stopping Smoking: Care Instructions. \"  Current as of: March 20, 2017  Content Version: 11.4  © 0706-0167 Healthwise, "[employer]. Care instructions adapted under license by [employer] (which disclaims liability or warranty for this information). If you have questions about a medical condition or this instruction, always ask your healthcare professional. [address] any warranty or liability for your use of this information.

## 2018-03-05 ENCOUNTER — CLINICAL SUPPORT (OUTPATIENT)
Dept: INTERNAL MEDICINE CLINIC | Age: 53
End: 2018-03-05

## 2018-03-05 VITALS — SYSTOLIC BLOOD PRESSURE: 130 MMHG | DIASTOLIC BLOOD PRESSURE: 86 MMHG

## 2018-03-05 DIAGNOSIS — Z01.30 BP CHECK: ICD-10-CM

## 2018-03-05 DIAGNOSIS — I10 ESSENTIAL HYPERTENSION: Primary | ICD-10-CM

## 2018-03-05 NOTE — PROGRESS NOTES
Pt states that this morning his blood pressure was 152/94 before taking medication. Pt was instructed to continue current medications as prescribed and to return for scheduled appointment.     Pt is here for   Chief Complaint   Patient presents with    Blood Pressure Check     BP Readings from Last 3 Encounters:   03/05/18 130/86   02/27/18 (!) 151/95   12/20/17 (!) 145/92

## 2018-03-27 ENCOUNTER — OFFICE VISIT (OUTPATIENT)
Dept: INTERNAL MEDICINE CLINIC | Age: 53
End: 2018-03-27

## 2018-03-27 VITALS
BODY MASS INDEX: 24.08 KG/M2 | HEIGHT: 67 IN | HEART RATE: 86 BPM | SYSTOLIC BLOOD PRESSURE: 148 MMHG | DIASTOLIC BLOOD PRESSURE: 88 MMHG | WEIGHT: 153.4 LBS | OXYGEN SATURATION: 98 % | RESPIRATION RATE: 18 BRPM | TEMPERATURE: 98.6 F

## 2018-03-27 DIAGNOSIS — I10 ESSENTIAL HYPERTENSION: Primary | ICD-10-CM

## 2018-03-27 DIAGNOSIS — J45.40 MODERATE PERSISTENT ASTHMA WITHOUT COMPLICATION: ICD-10-CM

## 2018-03-27 DIAGNOSIS — E11.9 TYPE 2 DIABETES MELLITUS WITHOUT COMPLICATION, WITHOUT LONG-TERM CURRENT USE OF INSULIN (HCC): ICD-10-CM

## 2018-03-27 DIAGNOSIS — R05.3 PERSISTENT COUGH: ICD-10-CM

## 2018-03-27 DIAGNOSIS — F17.210 CIGARETTE NICOTINE DEPENDENCE WITHOUT COMPLICATION: ICD-10-CM

## 2018-03-27 LAB — GLUCOSE POC: 144 MG/DL

## 2018-03-27 NOTE — PROGRESS NOTES
Pt is here for   Chief Complaint   Patient presents with    Hypertension     follow up     Pt denies pain at this time     1. Have you been to the ER, urgent care clinic since your last visit? Hospitalized since your last visit? No    2. Have you seen or consulted any other health care providers outside of the [address] [place] [address] since your last visit? Include any pap smears or colon screening.  No

## 2018-03-27 NOTE — MR AVS SNAPSHOT
[address] [place] [address] [address] [id number] 
[phone] Patient: Eloisa Nixon MRN: [id number] Visit Information Date & Time Provider Department Dept. Phone Encounter #  
 3/27/2018 10:40 AM Emi Carcamo NP [address] [facility] [phone] [id number] Follow-up Instructions Return in about 3 months (around 6/27/2018) for HTN, Cough/asthma, smok cess. Upcoming Health Maintenance Date Due  
 EYE EXAM RETINAL OR DILATED Q1 3/15/1974 FOBT Q 1 YEAR AGE 50-75 12/22/2017 FOOT EXAM Q1 3/22/2018 MICROALBUMIN Q1 3/22/2018 HEMOGLOBIN A1C Q6M 8/27/2018 LIPID PANEL Q1 2/27/2019 DTaP/Tdap/Td series (2 - Td) 12/22/2026 Allergies as of 3/27/2018  Review Complete On: 3/27/2018 By: Emi Carcamo NP No Known Allergies Current Immunizations  Reviewed on 12/22/2016 Name Date Influenza Vaccine (Quad) Intradermal PF 12/22/2016 Tdap 12/22/2016 Not reviewed this visit You Were Diagnosed With   
  
 Codes Comments Essential hypertension    -  Primary ICD-10-CM: I10 
ICD-9-CM: 401.9 Type 2 diabetes mellitus without complication, without long-term current use of insulin (HCC)     ICD-10-CM: E11.9 ICD-9-CM: 250.00 Cigarette nicotine dependence without complication     ALT-58-EH: F17.210 ICD-9-CM: 305.1 Persistent cough     ICD-10-CM: R05 ICD-9-CM: 786.2 Moderate persistent asthma without complication     WPL-84-BT: J45.40 ICD-9-CM: 493.90 Vitals BP Pulse Temp Resp Height(growth percentile) Weight(growth percentile) 148/88 (BP 1 Location: Left arm, BP Patient Position: Sitting) 86 98.6 °F (37 °C) (Oral) 18 5' 7\" (1.702 m) 153 lb 6.4 oz (69.6 kg) SpO2 BMI Smoking Status 98% 24.03 kg/m2 Current Every Day Smoker BMI and BSA Data Body Mass Index Body Surface Area 24.03 kg/m 2 1.81 m 2 Preferred Pharmacy Pharmacy Name Phone [facility] [address], [phone] [facility] [phone] Your Updated Medication List  
  
   
This list is accurate as of 3/27/18 11:58 AM.  Always use your most recent med list.  
  
  
  
  
 albuterol 90 mcg/actuation inhaler Commonly known as:  PROVENTIL HFA, VENTOLIN HFA, PROAIR HFA Take 2 Puffs by inhalation every four (4) hours as needed for Wheezing or Shortness of Breath. atorvastatin 20 mg tablet Commonly known as:  LIPITOR Take 1 Tab by mouth nightly. Indications: mixed hyperlipidemia  
  
 cloNIDine HCl 0.1 mg tablet Commonly known as:  CATAPRES Take 1 Tab by mouth nightly. Indications: Smoking Cessation  
  
 losartan 50 mg tablet Commonly known as:  COZAAR Take 1 Tab by mouth daily. Indications: hypertension  
  
 metFORMIN 500 mg tablet Commonly known as:  GLUCOPHAGE Take 1 Tab by mouth daily (with breakfast). Indications: type 2 diabetes mellitus We Performed the Following AMB POC GLUCOSE BLOOD, BY GLUCOSE MONITORING DEVICE [51512 CPT(R)] Follow-up Instructions Return in about 3 months (around 6/27/2018) for HTN, Cough/asthma, smok cess. To-Do List   
 03/27/2018 PFT:  PULMONARY FUNCTION TEST Patient Instructions Lung Function Tests: About These Tests What are these tests? Lung function tests measure how much air your lungs hold and how quickly your lungs can move the air in and out. Spirometry is often the first lung function test that is done. You may also have other tests, such as gas diffusion tests, body plethysmography, inhalation challenge tests, and exercise stress tests. Your doctor will explain which tests you need. These tests check how well your lungs work. They may also be called pulmonary function tests, or PFTs. Why are these tests done?  
Doctors use lung function tests to find the cause of breathing problems and diagnose lung diseases like asthma or emphysema. You may have lung function tests before you have surgery. Or your doctor may use lung function tests to find out how well treatment for a lung problem is working. How can you prepare for these tests? · Tell your doctor if you have any other medical problems, such as heart disease. · Tell your doctor if you are allergic to any medicines. · Let your doctor know if you take medicines for a lung problem. You may need to stop some of them before the tests. What else should you know before these tests? · Wear loose clothing that does not restrict your breathing. · If you have dentures, wear them during the test to help you form a tight seal around the spirometer's mouthpiece. · Do not eat a large meal just before the test. A full stomach may keep your lungs from fully expanding. · For 6 hours before the test, do not smoke or exercise hard. What happens during these tests? What happens during the test depends on the type of test you have. A respiratory therapist or technician will do the lung function tests. For most tests, you will wear a nose clip. This is to make sure that no air passes in or out of your nose during the test. You then breathe into a mouthpiece attached to a recording device. · For some tests, you breathe in and out as deeply and quickly as you can. · You may repeat some tests after you inhale a medicine that expands your airways. · You may breathe certain gases, such as 100% oxygen or a mixture of helium and air. · For body plethysmography, you sit inside a small gutierrez with windows. The gutierrez measures pressure changes that occur as you breathe. The therapist may urge you to breathe deeply during some of the tests to get the best results. You may have a blood test to check oxygen and carbon dioxide levels in your blood before, during, or after your lung function tests. How long do these tests take? The testing may take from 5 to 30 minutes, depending on how many tests you have. What happens after these tests? · You will probably be able to go home right after the tests. · You can go back to your normal activities right away. Follow-up care is a key part of your treatment and safety. Be sure to make and go to all appointments, and call your doctor if you are having problems. It's also a good idea to know your test results and keep a list of the medicines you take. Where can you learn more? Go to http://sterling-bharti.info/. Enter N445 in the search box to learn more about \"Lung Function Tests: About These Tests. \" Current as of: May 12, 2017 Content Version: 11.4 © 1372-2156 Healthwise, Incorporated. Care instructions adapted under license by [employer] (which disclaims liability or warranty for this information). If you have questions about a medical condition or this instruction, always ask your healthcare professional. [address] any warranty or liability for your use of this information. Introducing [place]! Dorothy Flowers introduces [employer] patient portal. Now you can access parts of your medical record, email your doctor's office, and request medication refills online. 1. In your internet browser, go to [email or web address] 2. Click on the First Time User? Click Here link in the Sign In box. You will see the New Member Sign Up page. 3. Enter your [employer] Access Code exactly as it appears below. You will not need to use this code after youve completed the sign-up process. If you do not sign up before the expiration date, you must request a new code. · [employer] Access Code: [email or web address] Expires: 4/24/2018  3:33 PM 
 
4. Enter the last four digits of your Social Security Number (xxxx) and Date of Birth (mm/dd/yyyy) as indicated and click Submit. You will be taken to the next sign-up page. 5. Create a [employer] ID. This will be your [employer] login ID and cannot be changed, so think of one that is secure and easy to remember. 6. Create a [employer] password. You can change your password at any time. 7. Enter your Password Reset Question and Answer. This can be used at a later time if you forget your password. 8. Enter your e-mail address. You will receive e-mail notification when new information is available in [address] 9. Click Sign Up. You can now view and download portions of your medical record. 10. Click the Download Summary menu link to download a portable copy of your medical information. If you have questions, please visit the Frequently Asked Questions section of the [employer] website. Remember, [employer] is NOT to be used for urgent needs. For medical emergencies, dial 911. Now available from your iPhone and Android! Please provide this summary of care documentation to your next provider. Your primary care clinician is listed as GEORGES Owusu. If you have any questions after today's visit, please call [phone].

## 2018-03-27 NOTE — PATIENT INSTRUCTIONS
Lung Function Tests: About These Tests  What are these tests? Lung function tests measure how much air your lungs hold and how quickly your lungs can move the air in and out. Spirometry is often the first lung function test that is done. You may also have other tests, such as gas diffusion tests, body plethysmography, inhalation challenge tests, and exercise stress tests. Your doctor will explain which tests you need. These tests check how well your lungs work. They may also be called pulmonary function tests, or PFTs. Why are these tests done? Doctors use lung function tests to find the cause of breathing problems and diagnose lung diseases like asthma or emphysema. You may have lung function tests before you have surgery. Or your doctor may use lung function tests to find out how well treatment for a lung problem is working. How can you prepare for these tests? · Tell your doctor if you have any other medical problems, such as heart disease. · Tell your doctor if you are allergic to any medicines. · Let your doctor know if you take medicines for a lung problem. You may need to stop some of them before the tests. What else should you know before these tests? · Wear loose clothing that does not restrict your breathing. · If you have dentures, wear them during the test to help you form a tight seal around the spirometer's mouthpiece. · Do not eat a large meal just before the test. A full stomach may keep your lungs from fully expanding. · For 6 hours before the test, do not smoke or exercise hard. What happens during these tests? What happens during the test depends on the type of test you have. A respiratory therapist or technician will do the lung function tests. For most tests, you will wear a nose clip. This is to make sure that no air passes in or out of your nose during the test. You then breathe into a mouthpiece attached to a recording device.   · For some tests, you breathe in and out as deeply and quickly as you can. · You may repeat some tests after you inhale a medicine that expands your airways. · You may breathe certain gases, such as 100% oxygen or a mixture of helium and air. · For body plethysmography, you sit inside a small gutierrez with windows. The gutierrez measures pressure changes that occur as you breathe. The therapist may urge you to breathe deeply during some of the tests to get the best results. You may have a blood test to check oxygen and carbon dioxide levels in your blood before, during, or after your lung function tests. How long do these tests take? The testing may take from 5 to 30 minutes, depending on how many tests you have. What happens after these tests? · You will probably be able to go home right after the tests. · You can go back to your normal activities right away. Follow-up care is a key part of your treatment and safety. Be sure to make and go to all appointments, and call your doctor if you are having problems. It's also a good idea to know your test results and keep a list of the medicines you take. Where can you learn more? Go to http://sterling-bharti.info/. Enter K843 in the search box to learn more about \"Lung Function Tests: About These Tests. \"  Current as of: May 12, 2017  Content Version: 11.4  © 0323-3961 Healthwise, Incorporated. Care instructions adapted under license by [employer] (which disclaims liability or warranty for this information). If you have questions about a medical condition or this instruction, always ask your healthcare professional. [address] any warranty or liability for your use of this information.

## 2018-03-27 NOTE — PROGRESS NOTES
Karis Joya is a 46 y.o. male and presents with Hypertension (follow up)    Subjective:  Hypertension Review:  The patient has hypertension  Diet and Lifestyle: generally does try to follow a  low sodium diet,  Exercises some. Home BP Monitoring: is measured at home, 130-160/80-90's  Pertinent ROS: taking medications as instructed, no medication side effects noted. No TIA's, chest pain on exertion, dyspnea on exertion, or swelling of ankles. +HA with elevated BP, no treatments tried however. BP Readings from Last 3 Encounters:   03/27/18 148/88   03/05/18 130/86   02/27/18 (!) 151/95     Smoking cessation Review:  Patient presents to discuss smoking cessation. The patient currently is smoking 1  Ppd, planning to stop for Ramadan. He has smoked for multiple years (~30). He has tried to quit multiple times in the past using patches, but not helpful. Would like to quit. Continues to have daily coughing also. Using inhaler most days with little relief. Review of Systems  Constitutional: negative for fevers, chills, anorexia and weight loss  Respiratory:  negative for hemoptysis, dyspnea  CV:   negative for chest pain, palpitations, and lower extremity edema  GI:   negative for nausea, vomiting, diarrhea, abdominal pain, and melena  Endo:               +DM. negative for polyuria,polydipsia,polyphagia, and heat intolerance  Genitourinary: negative for frequency, urgency, dysuria, retention, and hematuria  Integument:  negative for rash, ulcerations, and pruritus  Hematologic:  negative for easy bruising and bleeding  Musculoskel: negative for arthralgias, muscle weakness,and joint pain/swelling  Neurological:  negative for headaches, dizziness, vertigo,and memory/gait problems  Behavl/Psych: negative for feelings of anxiety, depression, suicide, and mood changes    Past Medical History:   Diagnosis Date    Diabetes ([facility] [address]     Hypertension      History reviewed. No pertinent surgical history.   Social History Social History    Marital status:      Spouse name: N/A    Number of children: N/A    Years of education: N/A     Social History Main Topics    Smoking status: Current Every Day Smoker     Packs/day: 1.00    Smokeless tobacco: Never Used    Alcohol use No    Drug use: No    Sexual activity: Yes     Partners: Female     Other Topics Concern    None     Social History Narrative     History reviewed. No pertinent family history. Current Outpatient Prescriptions   Medication Sig Dispense Refill    atorvastatin (LIPITOR) 20 mg tablet Take 1 Tab by mouth nightly. Indications: mixed hyperlipidemia 30 Tab 11    losartan (COZAAR) 50 mg tablet Take 1 Tab by mouth daily. Indications: hypertension 30 Tab 11    cloNIDine HCl (CATAPRES) 0.1 mg tablet Take 1 Tab by mouth nightly. Indications: Smoking Cessation 30 Tab 2    metFORMIN (GLUCOPHAGE) 500 mg tablet Take 1 Tab by mouth daily (with breakfast). Indications: type 2 diabetes mellitus 60 Tab 11    albuterol (PROVENTIL HFA, VENTOLIN HFA, PROAIR HFA) 90 mcg/actuation inhaler Take 2 Puffs by inhalation every four (4) hours as needed for Wheezing or Shortness of Breath. 1 Inhaler 0     No Known Allergies    Objective:  Visit Vitals    /88 (BP 1 Location: Left arm, BP Patient Position: Sitting)    Pulse 86    Temp 98.6 °F (37 °C) (Oral)    Resp 18    Ht 5' 7\" (1.702 m)    Wt 153 lb 6.4 oz (69.6 kg)    SpO2 98%    BMI 24.03 kg/m2     Wt Readings from Last 3 Encounters:   03/27/18 153 lb 6.4 oz (69.6 kg)   02/27/18 156 lb 6.4 oz (70.9 kg)   11/29/17 158 lb 8 oz (71.9 kg)     Physical Exam:   General appearance - alert, well appearing, and in no distress. Mental status - A/O x 4, normal mood and affect. Neck -Supple ,normal CSP. FROM, non-tender. No significant adenopathy/thyromegaly. No JVD. Chest -CTA. Symmetric chest rise. No rales, wheezing, or rhonchi. Heart - Normal rate, regular rhythm. Normal S1, S2. No MGR or clicks.   Abdomen - Soft,non-distended. Normoactive BS in all quadrants. NT, no mass or HSM. Ext- Radial, DP pulses, 2+ bilaterally. No pedal edema, clubbing, or cyanosis. Skin-Warm and dry. No hyperpigmentation, ulcerations, or suspicious lesions. Neuro - Normal speech, no focal findings or movement disorder. Normal strength, gait, and muscle tone. Results for orders placed or performed in visit on 03/27/18   AMB POC GLUCOSE BLOOD, BY GLUCOSE MONITORING DEVICE   Result Value Ref Range    Glucose  mg/dL     AssessmentPlan:  The current medical regimen is effective;  continue present plan and medications. Medication Side Effects and Warnings were discussed with patient: yes   Patient Labs were reviewed: yes  Patient Past Records were reviewed: yes    See below for other orders   Follow-up Disposition: Not on File      ICD-10-CM ICD-9-CM    1. Essential hypertension I10 401.9    2. Type 2 diabetes mellitus without complication, without long-term current use of insulin (HCC) E11.9 250.00 AMB POC GLUCOSE BLOOD, BY GLUCOSE MONITORING DEVICE   3. Cigarette nicotine dependence without complication H56.213 668.7      Orders Placed This Encounter    AMB POC GLUCOSE BLOOD, BY GLUCOSE MONITORING DEVICE       Yvon Deal expressed understanding of plan. An After Visit Summary was offered/printed and given to the patient.

## 2018-06-27 ENCOUNTER — OFFICE VISIT (OUTPATIENT)
Dept: INTERNAL MEDICINE CLINIC | Age: 53
End: 2018-06-27

## 2018-06-27 VITALS
SYSTOLIC BLOOD PRESSURE: 151 MMHG | OXYGEN SATURATION: 98 % | HEIGHT: 67 IN | HEART RATE: 79 BPM | WEIGHT: 150 LBS | TEMPERATURE: 97.3 F | RESPIRATION RATE: 18 BRPM | BODY MASS INDEX: 23.54 KG/M2 | DIASTOLIC BLOOD PRESSURE: 92 MMHG

## 2018-06-27 DIAGNOSIS — I10 ESSENTIAL HYPERTENSION: ICD-10-CM

## 2018-06-27 DIAGNOSIS — F17.210 CIGARETTE NICOTINE DEPENDENCE WITHOUT COMPLICATION: ICD-10-CM

## 2018-06-27 DIAGNOSIS — R80.9 TYPE 2 DIABETES MELLITUS WITH MICROALBUMINURIA, WITHOUT LONG-TERM CURRENT USE OF INSULIN (HCC): Primary | ICD-10-CM

## 2018-06-27 DIAGNOSIS — J45.20 MILD INTERMITTENT ASTHMA WITHOUT COMPLICATION: ICD-10-CM

## 2018-06-27 DIAGNOSIS — E11.29 TYPE 2 DIABETES MELLITUS WITH MICROALBUMINURIA, WITHOUT LONG-TERM CURRENT USE OF INSULIN (HCC): Primary | ICD-10-CM

## 2018-06-27 DIAGNOSIS — M79.642 LEFT HAND PAIN: ICD-10-CM

## 2018-06-27 DIAGNOSIS — E78.5 HYPERLIPIDEMIA, UNSPECIFIED HYPERLIPIDEMIA TYPE: ICD-10-CM

## 2018-06-27 LAB
ALBUMIN UR QL STRIP: 30 MG/L
CHOLEST SERPL-MCNC: 155 MG/DL
CREATININE, URINE POC: 200 MG/DL
GLUCOSE POC: 166 MG/DL
HBA1C MFR BLD HPLC: 5.7 %
HDLC SERPL-MCNC: 66 MG/DL
LDL CHOLESTEROL POC: 75 MG/DL
MICROALBUMIN/CREAT RATIO POC: <30 MG/G
NON-HDL GOAL (POC): 88
TCHOL/HDL RATIO (POC): 2.3
TRIGL SERPL-MCNC: 69 MG/DL

## 2018-06-27 NOTE — MR AVS SNAPSHOT
[address] [place] [address] [address] [id number] 
[phone] Patient: Kylie Finn MRN: [id number] PYS:[birth year] Visit Information Date & Time Provider Department Dept. Phone Encounter #  
 6/27/2018 10:20 AM Gene Price NP [address] [facility] [phone] [id number] Follow-up Instructions Return in about 12 weeks (around 9/19/2018) for HTN, Cough f/u. needs spirometry BEFORE next OV. Laureen Daily Upcoming Health Maintenance Date Due  
 EYE EXAM RETINAL OR DILATED Q1 3/15/1974 FOBT Q 1 YEAR AGE 50-75 12/22/2017 FOOT EXAM Q1 3/22/2018 MICROALBUMIN Q1 3/22/2018 Influenza Age 5 to Adult 8/1/2018 HEMOGLOBIN A1C Q6M 8/27/2018 LIPID PANEL Q1 2/27/2019 DTaP/Tdap/Td series (2 - Td) 12/22/2026 Allergies as of 6/27/2018  Review Complete On: 6/27/2018 By: Silver Hoskins. HIRAM Keith No Known Allergies Current Immunizations  Reviewed on 12/22/2016 Name Date Influenza Vaccine (Quad) Intradermal PF 12/22/2016 Tdap 12/22/2016 Not reviewed this visit You Were Diagnosed With   
  
 Codes Comments Type 2 diabetes mellitus with microalbuminuria, without long-term current use of insulin (HCC)    -  Primary ICD-10-CM: E11.29, R80.9 ICD-9-CM: 250.40, 791.0 Essential hypertension     ICD-10-CM: I10 
ICD-9-CM: 401.9 Hyperlipidemia, unspecified hyperlipidemia type     ICD-10-CM: E78.5 ICD-9-CM: 272.4 Mild intermittent asthma without complication     RKC-64-VU: J45.20 ICD-9-CM: 493.90 Cigarette nicotine dependence without complication     [facility]-82-JQ: F17.210 ICD-9-CM: 305.1 Vitals BP Pulse Temp Resp Height(growth percentile) Weight(growth percentile) (!) 151/92 (BP 1 Location: Right arm, BP Patient Position: Sitting) 79 97.3 °F (36.3 °C) (Oral) 18 5' 7\" (1.702 m) 150 lb (68 kg) SpO2 BMI Smoking Status 98% 23.49 kg/m2 Current Every Day Smoker Vitals History BMI and BSA Data Body Mass Index Body Surface Area  
 23.49 kg/m 2 1.79 m 2 Preferred Pharmacy Pharmacy Name Phone [facility] [phone] [facility] [phone] Your Updated Medication List  
  
   
This list is accurate as of 6/27/18 11:24 AM.  Always use your most recent med list.  
  
  
  
  
 albuterol 90 mcg/actuation inhaler Commonly known as:  PROVENTIL HFA, VENTOLIN HFA, PROAIR HFA Take 2 Puffs by inhalation every four (4) hours as needed for Wheezing or Shortness of Breath. atorvastatin 20 mg tablet Commonly known as:  LIPITOR Take 1 Tab by mouth nightly. Indications: mixed hyperlipidemia  
  
 cloNIDine HCl 0.1 mg tablet Commonly known as:  CATAPRES Take 1 Tab by mouth nightly. Indications: Smoking Cessation  
  
 losartan 50 mg tablet Commonly known as:  COZAAR Take 1 Tab by mouth daily. Indications: hypertension  
  
 metFORMIN 500 mg tablet Commonly known as:  GLUCOPHAGE Take 1 Tab by mouth daily (with breakfast). Indications: type 2 diabetes mellitus We Performed the Following AMB POC GLUCOSE BLOOD, BY GLUCOSE MONITORING DEVICE [75547 CPT(R)] AMB POC HEMOGLOBIN A1C [36526 CPT(R)] AMB POC LIPID PROFILE [58817 CPT(R)] AMB POC SPIROMETRY W/BRONCHODILATOR [28774 CPT(R)] AMB POC URINE, MICROALBUMIN, SEMIQUANT (3 RESULTS) [81548 CPT(R)] Follow-up Instructions Return in about 12 weeks (around 9/19/2018) for HTN, Cough f/u. needs spirometry BEFORE next OV. Demar Tipton Patient Instructions Learning About Benefits From Quitting Smoking How does quitting smoking make you healthier? If you're thinking about quitting smoking, you may have a few reasons to be smoke-free. Your health may be one of them.  
· When you quit smoking, you lower your risks for cancer, lung disease, heart attack, stroke, blood vessel disease, and blindness from macular degeneration. · When you're smoke-free, you get sick less often, and you heal faster. You are less likely to get colds, flu, bronchitis, and pneumonia. · As a nonsmoker, you may find that your mood is better and you are less stressed. When and how will you feel healthier? Quitting has real health benefits that start from day 1 of being smoke-free. And the longer you stay smoke-free, the healthier you get and the better you feel. The first hours · After just 20 minutes, your blood pressure and heart rate go down. That means there's less stress on your heart and blood vessels. · Within 12 hours, the level of carbon monoxide in your blood drops back to normal. That makes room for more oxygen. With more oxygen in your body, you may notice that you have more energy than when you smoked. After 2 weeks · Your lungs start to work better. · Your risk of heart attack starts to drop. After 1 month · When your lungs are clear, you cough less and breathe deeper, so it's easier to be active. · Your sense of taste and smell return. That means you can enjoy food more than you have since you started smoking. Over the years · After 1 year, your risk of heart disease is half what it would be if you kept smoking. · After 5 years, your risk of stroke starts to shrink. Within a few years after that, it's about the same as if you'd never smoked. · After 10 years, your risk of dying from lung cancer is cut by about half. And your risk for many other types of cancer is lower too. How would quitting help others in your life? When you quit smoking, you improve the health of everyone who now breathes in your smoke. · Their heart, lung, and cancer risks drop, much like yours. · They are sick less. For babies and small children, living smoke-free means they're less likely to have ear infections, pneumonia, and bronchitis. · If you're a woman who is or will be pregnant someday, quitting smoking means a healthier . · Children who are close to you are less likely to become adult smokers. Where can you learn more? Go to http://sterling-bharti.info/. Enter [phone] in the search box to learn more about \"Learning About Benefits From Quitting Smoking. \" Current as of: March 20, 2017 Content Version: 11.4 © 8379-3130 [employer] Care instructions adapted under license by [employer] (which disclaims liability or warranty for this information). If you have questions about a medical condition or this instruction, always ask your healthcare professional. [place] [address] any warranty or liability for your use of this information. Learning About Lung Cancer Screening What is screening for lung cancer? Lung cancer screening is a way to find some lung cancers early, when a cure is more likely. Lung cancer screening may help those who have the highest risk for lung cancer-people 55 and older who are or were heavy smokers. For most people, who aren't at increased risk, screening for lung cancer probably isn't helpful. Screening won't prevent cancer. And it may not find all lung cancers. Lung cancer screening may lower the risk of dying from lung cancer in a small number of people. How is it done? Lung cancer screening is done with a low-dose CT (computed tomography) scan. A CT scan uses X-rays, or radiation, to make detailed pictures of your body. Experts recommend that screening be done in medical centers that focus on finding and treating lung cancer. Who is screening recommended for? Lung screening is only recommended for people who are or were heavy smokers. That means people with a smoking history of at least 30 pack years. A pack year is a way to measure how heavy a smoker you are or were. To figure out your pack years, multiply how many packs a day on average (assuming 20 cigarettes per pack) you have smoked by how many years you have smoked. For example: · If you smoked 1 pack a day for 15 years, that's 1 times 15. So you have a smoking history of 15 pack years. · If you smoked 2 packs a day for 15 years, that's 2 times 15. So you have a smoking history of 30 pack years. The U.S. Preventive Services Task Force recommends lung cancer screening if: 
· You are 54to [de-identified]years old. · And you have a smoking history of at least 30 pack years. · And you still smoke, or you quit within the last 15 years. · And you are not in poor health overall. (Having a serious health problem might mean that you couldn't have treatment for lung cancer. The treatment could be too high-risk, and it might not help you live longer.) What are the risks of screening? CT screening for lung cancer isn't perfect. It can show an abnormal result when it turns out there was not any cancer. This is called a false-positive result. This means you may need more tests to make sure you don't have cancer. These tests can be harmful and cause a lot of worry. These tests may include more CT scans and invasive testing like a lung biopsy. In a biopsy, the doctor takes a sample of tissue from inside your lung so it can be looked at under a microscope. A biopsy is the only way to tell if you have lung cancer. If the biopsy finds cancer, you and your doctor will have to decide how or whether to treat it. Some lung cancers found on CT scans are harmless and would not have caused a problem if they had not been found through screening. But because doctors can't tell which ones will turn out to be harmless, most will be treated. This means that you may get treatment-including surgery, radiation, or chemotherapy-that you don't need. There is a small chance of getting cancer from being exposed to radiation. A low-dose CT scan uses more radiation than a regular chest X-ray. But it uses much less than a regular-dose CT scan.  You and your doctor will decide if the possibility of finding lung cancer early is worth the risk of having this test and being exposed to the radiation. What are the benefits of screening? Your scan may be normal (negative). For some people who are at higher risk, screening lowers the chance of dying of lung cancer. How much and how long you smoked helps to determine your risk level. Screening can find some cancers early, when treatment may be more likely to work. What happens after screening? The results of your CT scan will be sent to your doctor. Someone from your care team will explain the results of your scan and answer any questions you may have. If you need any follow-up, he or she will help you understand what to do next. After a lung cancer screening, you can go back to your usual activities right away. A lung cancer screening test can't tell if you have lung cancer. If your results are positive, your doctor can't tell whether an abnormal finding is a harmless nodule, cancer, or something else without doing more tests. What can you do to prevent lung cancer? Don't smoke. Most lung cancers are caused by smoking. If you have already quit smoking, you've taken the best step you can to prevent lung cancer. And if you still smoke, the best way to lower your chance of getting or dying from lung cancer is to quit. Your doctor may recommend medicines that can help you quit. Follow-up care is a key part of your treatment and safety. Be sure to make and go to all appointments, and call your doctor if you are having problems. It's also a good idea to know your test results and keep a list of the medicines you take. Where can you learn more? Go to http://sterling-bharti.info/. Enter ([phone] [phone] in the search box to learn more about \"Learning About Lung Cancer Screening. \" Current as of: May 12, 2017 Content Version: 11.4 © 2361-3310 [employer].  Care instructions adapted under license by [address] (which disclaims liability or warranty for this information). If you have questions about a medical condition or this instruction, always ask your healthcare professional. [address] any warranty or liability for your use of this information. Lung Function Tests: About These Tests What are these tests? Lung function tests measure how much air your lungs hold and how quickly your lungs can move the air in and out. Spirometry is often the first lung function test that is done. You may also have other tests, such as gas diffusion tests, body plethysmography, inhalation challenge tests, and exercise stress tests. Your doctor will explain which tests you need. These tests check how well your lungs work. They may also be called pulmonary function tests, or PFTs. Why are these tests done? Doctors use lung function tests to find the cause of breathing problems and diagnose lung diseases like asthma or emphysema. You may have lung function tests before you have surgery. Or your doctor may use lung function tests to find out how well treatment for a lung problem is working. How can you prepare for these tests? · Tell your doctor if you have any other medical problems, such as heart disease. · Tell your doctor if you are allergic to any medicines. · Let your doctor know if you take medicines for a lung problem. You may need to stop some of them before the tests. What else should you know before these tests? · Wear loose clothing that does not restrict your breathing. · If you have dentures, wear them during the test to help you form a tight seal around the spirometer's mouthpiece. · Do not eat a large meal just before the test. A full stomach may keep your lungs from fully expanding. · For 6 hours before the test, do not smoke or exercise hard. What happens during these tests? What happens during the test depends on the type of test you have.  A respiratory therapist or technician will do the lung function tests. For most tests, you will wear a nose clip. This is to make sure that no air passes in or out of your nose during the test. You then breathe into a mouthpiece attached to a recording device. · For some tests, you breathe in and out as deeply and quickly as you can. · You may repeat some tests after you inhale a medicine that expands your airways. · You may breathe certain gases, such as 100% oxygen or a mixture of helium and air. · For body plethysmography, you sit inside a small gutierrez with windows. The gutierrez measures pressure changes that occur as you breathe. The therapist may urge you to breathe deeply during some of the tests to get the best results. You may have a blood test to check oxygen and carbon dioxide levels in your blood before, during, or after your lung function tests. How long do these tests take? The testing may take from 5 to 30 minutes, depending on how many tests you have. What happens after these tests? · You will probably be able to go home right after the tests. · You can go back to your normal activities right away. Follow-up care is a key part of your treatment and safety. Be sure to make and go to all appointments, and call your doctor if you are having problems. It's also a good idea to know your test results and keep a list of the medicines you take. Where can you learn more? Go to http://sterling-bharti.info/. Enter H727 in the search box to learn more about \"Lung Function Tests: About These Tests. \" Current as of: May 12, 2017 Content Version: 11.4 © 3281-7007 [employer]. Care instructions adapted under license by [employer] (which disclaims liability or warranty for this information).  If you have questions about a medical condition or this instruction, always ask your healthcare professional. Alisa Mendez[employer] disclaims any warranty or liability for your use of this information. Introducing [facility]! Shamika Bee introduces CoverItLive patient portal. Now you can access parts of your medical record, email your doctor's office, and request medication refills online. 1. In your internet browser, go to [email or web address]. Access Closure/ACTIVE Network 2. Click on the First Time User? Click Here link in the Sign In box. You will see the New Member Sign Up page. 3. Enter your CoverItLive Access Code exactly as it appears below. You will not need to use this code after youve completed the sign-up process. If you do not sign up before the expiration date, you must request a new code. · CoverItLive Access Code: [email or web address] Expires: 9/25/2018 10:15 AM 
 
4. Enter the last four digits of your Social Security Number (xxxx) and Date of Birth (mm/dd/yyyy) as indicated and click Submit. You will be taken to the next sign-up page. 5. Create a CoverItLive ID. This will be your CoverItLive login ID and cannot be changed, so think of one that is secure and easy to remember. 6. Create a CoverItLive password. You can change your password at any time. 7. Enter your Password Reset Question and Answer. This can be used at a later time if you forget your password. 8. Enter your e-mail address. You will receive e-mail notification when new information is available in [address] 9. Click Sign Up. You can now view and download portions of your medical record. 10. Click the Download Summary menu link to download a portable copy of your medical information. If you have questions, please visit the Frequently Asked Questions section of the CoverItLive website. Remember, CoverItLive is NOT to be used for urgent needs. For medical emergencies, dial 911. Now available from your iPhone and Android! Please provide this summary of care documentation to your next provider. Your primary care clinician is listed as GEORGES CAREY Mota. If you have any questions after today's visit, please call [phone].

## 2018-06-27 NOTE — PROGRESS NOTES
Ed Delay is a 46 y.o. male and presents with Diabetes (follow up) and Hypertension (follow up)    Subjective:  Diabetes Review:  male has diet- controlled/stable diabetes  Diabetic ROS -negative for polyuria,polydipsia,polyphagia, and heat intolerance  Medication compliance: good  Diabetic diet compliance: compliant MOST of the time,   Prior visit with dietician: no  Exercises sporadically, no  Weight: losing with increased exercise and diet change  No results found for: HBA1C, HGBE8, HVJ2UVWI, XRV0LDHP    Hypertension Review:  The patient has hypertension  Diet and Lifestyle: generally does try to follow a  low sodium diet,  Exercises some. Home BP Monitoring: is measured at home, 130-160/80-90's  Pertinent ROS: taking medications as instructed, no medication side effects noted. No TIA's, chest pain on exertion, dyspnea on exertion, or swelling of ankles. +HA with elevated BP, no treatments tried however. BP Readings from Last 3 Encounters:   06/27/18 (!) 151/92   03/27/18 148/88   03/05/18 130/86     Smoking cessation Review:  Patient presents to discuss smoking cessation. The patient currently is smoking 1.5 Ppd. He has smoked for multiple years (~30). He has tried to quit multiple times in the past using patches, but not helpful. Continues to have daily coughing. Using inhaler most days with some relief. Review of Systems  Constitutional: negative for fevers, chills, anorexia and weight loss  Respiratory:  negative for hemoptysis, dyspnea  CV:   negative for chest pain, palpitations, and lower extremity edema  GI:   negative for nausea, vomiting, diarrhea, abdominal pain, and melena  Endo:               +DM.  negative for polyuria,polydipsia,polyphagia, and heat intolerance  Genitourinary: negative for frequency, urgency, dysuria, retention, and hematuria  Integument:  negative for rash, ulcerations, and pruritus  Hematologic:  negative for easy bruising and bleeding  Musculoskel: negative for arthralgias, muscle weakness,and joint pain/swelling  Neurological:  negative for headaches, dizziness, vertigo,and memory/gait problems  Behavl/Psych: negative for feelings of anxiety, depression, suicide, and mood changes    Past Medical History:   Diagnosis Date    Diabetes ([address]     Hypertension      History reviewed. No pertinent surgical history. Social History     Social History    Marital status:      Spouse name: N/A    Number of children: N/A    Years of education: N/A     Social History Main Topics    Smoking status: Current Every Day Smoker     Packs/day: 1.00    Smokeless tobacco: Never Used    Alcohol use No    Drug use: No    Sexual activity: Yes     Partners: Female     Other Topics Concern    None     Social History Narrative     History reviewed. No pertinent family history. Current Outpatient Prescriptions   Medication Sig Dispense Refill    atorvastatin (LIPITOR) 20 mg tablet Take 1 Tab by mouth nightly. Indications: mixed hyperlipidemia 30 Tab 11    losartan (COZAAR) 50 mg tablet Take 1 Tab by mouth daily. Indications: hypertension 30 Tab 11    cloNIDine HCl (CATAPRES) 0.1 mg tablet Take 1 Tab by mouth nightly. Indications: Smoking Cessation 30 Tab 2    metFORMIN (GLUCOPHAGE) 500 mg tablet Take 1 Tab by mouth daily (with breakfast). Indications: type 2 diabetes mellitus 60 Tab 11    albuterol (PROVENTIL HFA, VENTOLIN HFA, PROAIR HFA) 90 mcg/actuation inhaler Take 2 Puffs by inhalation every four (4) hours as needed for Wheezing or Shortness of Breath.  1 Inhaler 0     No Known Allergies    Objective:  Visit Vitals    BP (!) 151/92 (BP 1 Location: Right arm, BP Patient Position: Sitting)    Pulse 79    Temp 97.3 °F (36.3 °C) (Oral)    Resp 18    Ht 5' 7\" (1.702 m)    Wt 150 lb (68 kg)    SpO2 98%    BMI 23.49 kg/m2     Wt Readings from Last 3 Encounters:   06/27/18 150 lb (68 kg)   03/27/18 153 lb 6.4 oz (69.6 kg)   02/27/18 156 lb 6.4 oz (70.9 kg)     Physical Exam:   General appearance - alert, well appearing, and in no distress. Mental status - A/O x 4, normal mood and affect. Neck -Supple ,normal CSP. FROM, non-tender. No significant adenopathy/thyromegaly. No JVD. Chest -CTA. Symmetric chest rise. No rales, wheezing, or rhonchi. +congested cough. Heart - Normal rate, regular rhythm. Normal S1, S2. No MGR or clicks. Abdomen - Soft,non-distended. Normoactive BS in all quadrants. NT, no mass or HSM. Ext- Radial, DP pulses, 2+ bilaterally. No pedal edema, clubbing, or cyanosis. Skin-Warm and dry. No hyperpigmentation, ulcerations, or suspicious lesions. Neuro - Normal speech, no focal findings or movement disorder. Normal strength, gait, and muscle tone. Results for orders placed or performed in visit on 06/27/18   AMB POC GLUCOSE BLOOD, BY GLUCOSE MONITORING DEVICE   Result Value Ref Range    Glucose  mg/dL   AMB POC LIPID PROFILE   Result Value Ref Range    Cholesterol (POC) 155     Triglycerides (POC) 69     HDL Cholesterol (POC) 66     LDL Cholesterol (POC) 75 MG/DL    Non-HDL Goal (POC) 88     TChol/HDL Ratio (POC) 2.3    AMB POC HEMOGLOBIN A1C   Result Value Ref Range    Hemoglobin A1c (POC) 5.7 %     AssessmentPlan:  The current medical regimen is effective;  continue present plan and medications. Smoking cessation discussed for 3-10 minutes, pt planning to continue smoking, no meds today. Medication Side Effects and Warnings were discussed with patient: yes   Patient Labs were reviewed: yes  Patient Past Records were reviewed: yes    See below for other orders   Follow-up Disposition:  Return in about 12 weeks (around 9/19/2018) for HTN, Cough f/u. needs spirometry BEFORE next OV. .      ICD-10-CM ICD-9-CM    1.  Type 2 diabetes mellitus with microalbuminuria, without long-term current use of insulin ([facility]) E11.29 250.40 AMB POC GLUCOSE BLOOD, BY GLUCOSE MONITORING DEVICE    R80.9 791.0 AMB POC HEMOGLOBIN A1C      AMB POC URINE, MICROALBUMIN, SEMIQUANT (3 RESULTS)   2. Essential hypertension I10 401.9    3. Hyperlipidemia, unspecified hyperlipidemia type E78.5 272.4 AMB POC LIPID PROFILE   4. Mild intermittent asthma without complication M29.56 481.97 AMB POC SPIROMETRY W/BRONCHODILATOR   5. Cigarette nicotine dependence without complication C84.258 554.7      Orders Placed This Encounter    AMB POC SPIROMETRY W/BRONCHODILATOR    AMB POC GLUCOSE BLOOD, BY GLUCOSE MONITORING DEVICE    AMB POC LIPID PROFILE    AMB POC HEMOGLOBIN A1C    AMB POC URINE, MICROALBUMIN, SEMIQUANT (3 RESULTS)       Daniel Dela Cruz expressed understanding of plan. An After Visit Summary was offered/printed and given to the patient.

## 2018-06-27 NOTE — PATIENT INSTRUCTIONS
May use Phenylephrine, Chlor-Trimetron, Claritin, Allegra, xyzal, or Zyrtec also for symptoms. Learning About Benefits From Quitting Smoking  How does quitting smoking make you healthier? If you're thinking about quitting smoking, you may have a few reasons to be smoke-free. Your health may be one of them. · When you quit smoking, you lower your risks for cancer, lung disease, heart attack, stroke, blood vessel disease, and blindness from macular degeneration. · When you're smoke-free, you get sick less often, and you heal faster. You are less likely to get colds, flu, bronchitis, and pneumonia. · As a nonsmoker, you may find that your mood is better and you are less stressed. When and how will you feel healthier? Quitting has real health benefits that start from day 1 of being smoke-free. And the longer you stay smoke-free, the healthier you get and the better you feel. The first hours  · After just 20 minutes, your blood pressure and heart rate go down. That means there's less stress on your heart and blood vessels. · Within 12 hours, the level of carbon monoxide in your blood drops back to normal. That makes room for more oxygen. With more oxygen in your body, you may notice that you have more energy than when you smoked. After 2 weeks  · Your lungs start to work better. · Your risk of heart attack starts to drop. After 1 month  · When your lungs are clear, you cough less and breathe deeper, so it's easier to be active. · Your sense of taste and smell return. That means you can enjoy food more than you have since you started smoking. Over the years  · After 1 year, your risk of heart disease is half what it would be if you kept smoking. · After 5 years, your risk of stroke starts to shrink. Within a few years after that, it's about the same as if you'd never smoked. · After 10 years, your risk of dying from lung cancer is cut by about half.  And your risk for many other types of cancer is lower too. How would quitting help others in your life? When you quit smoking, you improve the health of everyone who now breathes in your smoke. · Their heart, lung, and cancer risks drop, much like yours. · They are sick less. For babies and small children, living smoke-free means they're less likely to have ear infections, pneumonia, and bronchitis. · If you're a woman who is or will be pregnant someday, quitting smoking means a healthier . · Children who are close to you are less likely to become adult smokers. Where can you learn more? Go to http://sterlingTapomatbharti.info/. Enter [id number] in the search box to learn more about \"Learning About Benefits From Quitting Smoking. \"  Current as of: 2017  Content Version: 11.4  © 9315-4788 [employer]. Care instructions adapted under license by [employer] (which disclaims liability or warranty for this information). If you have questions about a medical condition or this instruction, always ask your healthcare professional. [place] [address] any warranty or liability for your use of this information. Learning About Lung Cancer Screening  What is screening for lung cancer? Lung cancer screening is a way to find some lung cancers early, when a cure is more likely. Lung cancer screening may help those who have the highest risk for lung cancer-people 55 and older who are or were heavy smokers. For most people, who aren't at increased risk, screening for lung cancer probably isn't helpful. Screening won't prevent cancer. And it may not find all lung cancers. Lung cancer screening may lower the risk of dying from lung cancer in a small number of people. How is it done? Lung cancer screening is done with a low-dose CT (computed tomography) scan. A CT scan uses X-rays, or radiation, to make detailed pictures of your body.  Experts recommend that screening be done in medical centers that focus on finding and treating lung cancer. Who is screening recommended for? Lung screening is only recommended for people who are or were heavy smokers. That means people with a smoking history of at least 30 pack years. A pack year is a way to measure how heavy a smoker you are or were. To figure out your pack years, multiply how many packs a day on average (assuming 20 cigarettes per pack) you have smoked by how many years you have smoked. For example:  · If you smoked 1 pack a day for 15 years, that's 1 times 15. So you have a smoking history of 15 pack years. · If you smoked 2 packs a day for 15 years, that's 2 times 15. So you have a smoking history of 30 pack years. The U.S. Preventive Services Task Force recommends lung cancer screening if:  · You are 54to [de-identified]years old. · And you have a smoking history of at least 30 pack years. · And you still smoke, or you quit within the last 15 years. · And you are not in poor health overall. (Having a serious health problem might mean that you couldn't have treatment for lung cancer. The treatment could be too high-risk, and it might not help you live longer.)  What are the risks of screening? CT screening for lung cancer isn't perfect. It can show an abnormal result when it turns out there was not any cancer. This is called a false-positive result. This means you may need more tests to make sure you don't have cancer. These tests can be harmful and cause a lot of worry. These tests may include more CT scans and invasive testing like a lung biopsy. In a biopsy, the doctor takes a sample of tissue from inside your lung so it can be looked at under a microscope. A biopsy is the only way to tell if you have lung cancer. If the biopsy finds cancer, you and your doctor will have to decide how or whether to treat it. Some lung cancers found on CT scans are harmless and would not have caused a problem if they had not been found through screening.  But because doctors can't tell which ones will turn out to be harmless, most will be treated. This means that you may get treatment-including surgery, radiation, or chemotherapy-that you don't need. There is a small chance of getting cancer from being exposed to radiation. A low-dose CT scan uses more radiation than a regular chest X-ray. But it uses much less than a regular-dose CT scan. You and your doctor will decide if the possibility of finding lung cancer early is worth the risk of having this test and being exposed to the radiation. What are the benefits of screening? Your scan may be normal (negative). For some people who are at higher risk, screening lowers the chance of dying of lung cancer. How much and how long you smoked helps to determine your risk level. Screening can find some cancers early, when treatment may be more likely to work. What happens after screening? The results of your CT scan will be sent to your doctor. Someone from your care team will explain the results of your scan and answer any questions you may have. If you need any follow-up, he or she will help you understand what to do next. After a lung cancer screening, you can go back to your usual activities right away. A lung cancer screening test can't tell if you have lung cancer. If your results are positive, your doctor can't tell whether an abnormal finding is a harmless nodule, cancer, or something else without doing more tests. What can you do to prevent lung cancer? Don't smoke. Most lung cancers are caused by smoking. If you have already quit smoking, you've taken the best step you can to prevent lung cancer. And if you still smoke, the best way to lower your chance of getting or dying from lung cancer is to quit. Your doctor may recommend medicines that can help you quit. Follow-up care is a key part of your treatment and safety. Be sure to make and go to all appointments, and call your doctor if you are having problems.  It's also a good idea to know your test results and keep a list of the medicines you take. Where can you learn more? Go to http://sterling-bharti.info/. Enter ([phone] [phone] in the search box to learn more about \"Learning About Lung Cancer Screening. \"  Current as of: May 12, 2017  Content Version: 11.4  © 3329-8792 "[employer]. Care instructions adapted under license by [employer] (which disclaims liability or warranty for this information). If you have questions about a medical condition or this instruction, always ask your healthcare professional. [address] any warranty or liability for your use of this information. Lung Function Tests: About These Tests  What are these tests? Lung function tests measure how much air your lungs hold and how quickly your lungs can move the air in and out. Spirometry is often the first lung function test that is done. You may also have other tests, such as gas diffusion tests, body plethysmography, inhalation challenge tests, and exercise stress tests. Your doctor will explain which tests you need. These tests check how well your lungs work. They may also be called pulmonary function tests, or PFTs. Why are these tests done? Doctors use lung function tests to find the cause of breathing problems and diagnose lung diseases like asthma or emphysema. You may have lung function tests before you have surgery. Or your doctor may use lung function tests to find out how well treatment for a lung problem is working. How can you prepare for these tests? · Tell your doctor if you have any other medical problems, such as heart disease. · Tell your doctor if you are allergic to any medicines. · Let your doctor know if you take medicines for a lung problem. You may need to stop some of them before the tests. What else should you know before these tests? · Wear loose clothing that does not restrict your breathing.   · If you have dentures, wear them during the test to help you form a tight seal around the spirometer's mouthpiece. · Do not eat a large meal just before the test. A full stomach may keep your lungs from fully expanding. · For 6 hours before the test, do not smoke or exercise hard. What happens during these tests? What happens during the test depends on the type of test you have. A respiratory therapist or technician will do the lung function tests. For most tests, you will wear a nose clip. This is to make sure that no air passes in or out of your nose during the test. You then breathe into a mouthpiece attached to a recording device. · For some tests, you breathe in and out as deeply and quickly as you can. · You may repeat some tests after you inhale a medicine that expands your airways. · You may breathe certain gases, such as 100% oxygen or a mixture of helium and air. · For body plethysmography, you sit inside a small gutierrez with windows. The gutierrez measures pressure changes that occur as you breathe. The therapist may urge you to breathe deeply during some of the tests to get the best results. You may have a blood test to check oxygen and carbon dioxide levels in your blood before, during, or after your lung function tests. How long do these tests take? The testing may take from 5 to 30 minutes, depending on how many tests you have. What happens after these tests? · You will probably be able to go home right after the tests. · You can go back to your normal activities right away. Follow-up care is a key part of your treatment and safety. Be sure to make and go to all appointments, and call your doctor if you are having problems. It's also a good idea to know your test results and keep a list of the medicines you take. Where can you learn more? Go to http://sterling-bharti.info/. Enter Q651 in the search box to learn more about \"Lung Function Tests: About These Tests. \"  Current as of:  May 12, 2017  Content Version: 11.4  © 3298-6939 [employer]. Care instructions adapted under license by [employer] (which disclaims liability or warranty for this information). If you have questions about a medical condition or this instruction, always ask your healthcare professional. [address] any warranty or liability for your use of this information.

## 2018-09-19 ENCOUNTER — OFFICE VISIT (OUTPATIENT)
Dept: INTERNAL MEDICINE CLINIC | Age: 53
End: 2018-09-19

## 2018-09-19 VITALS
RESPIRATION RATE: 17 BRPM | HEART RATE: 86 BPM | OXYGEN SATURATION: 98 % | BODY MASS INDEX: 24.8 KG/M2 | SYSTOLIC BLOOD PRESSURE: 142 MMHG | TEMPERATURE: 97.8 F | WEIGHT: 158 LBS | DIASTOLIC BLOOD PRESSURE: 87 MMHG | HEIGHT: 67 IN

## 2018-09-19 DIAGNOSIS — J45.20 MILD INTERMITTENT ASTHMA WITHOUT COMPLICATION: ICD-10-CM

## 2018-09-19 DIAGNOSIS — M25.512 ACUTE PAIN OF LEFT SHOULDER: ICD-10-CM

## 2018-09-19 DIAGNOSIS — I10 ESSENTIAL HYPERTENSION: Primary | ICD-10-CM

## 2018-09-19 DIAGNOSIS — F17.210 CIGARETTE NICOTINE DEPENDENCE WITHOUT COMPLICATION: ICD-10-CM

## 2018-09-19 DIAGNOSIS — Z23 ENCOUNTER FOR IMMUNIZATION: ICD-10-CM

## 2018-09-19 RX ORDER — IBUPROFEN 200 MG
1 TABLET ORAL EVERY 24 HOURS
Qty: 30 PATCH | Refills: 2 | Status: SHIPPED | OUTPATIENT
Start: 2018-09-19

## 2018-09-19 RX ORDER — DICLOFENAC SODIUM 10 MG/G
2 GEL TOPICAL EVERY 6 HOURS
Qty: 100 G | Refills: 2 | Status: SHIPPED | OUTPATIENT
Start: 2018-09-19 | End: 2019-09-12 | Stop reason: SDUPTHER

## 2018-09-19 NOTE — PROGRESS NOTES
Pt is here for   Chief Complaint   Patient presents with    Cough     follow up    Hypertension     follow up     Pt states pain level is a 0/10    1. Have you been to the ER, urgent care clinic since your last visit? Hospitalized since your last visit? No    2. Have you seen or consulted any other health care providers outside of the [facility] since your last visit? Include any pap smears or colon screening.  No

## 2018-09-19 NOTE — PROGRESS NOTES
Amado Mahmood is a 46 y.o. male and presents with Cough (follow up) and Hypertension (follow up)    Subjective:  Hypertension Review:  The patient has hypertension  Diet and Lifestyle: generally does try to follow a  low sodium diet,  Exercises some. Home BP Monitoring: is measured at home, 130-160/80-90's  Pertinent ROS: taking medications as instructed, no medication side effects noted. No TIA's, chest pain on exertion, dyspnea on exertion, or swelling of ankles. +HA with elevated BP, no treatments tried however. BP Readings from Last 3 Encounters:   09/19/18 142/87   06/27/18 (!) 151/92   03/27/18 148/88     Smoking cessation Review:  Patient presents to discuss smoking cessation. The patient currently is smoking 1 Ppd, cut back from 1.5 ppd. He has smoked for multiple years (~30). He has tried to quit multiple times in the past using patches, but open to trying again. Daily coughing has stopped. Not using inhaler often, < 2x weekly. Shoulder Pain Review:  Patient complains of left side shoulder pain. The symptoms began several weeks ago. Course of symptoms since onset has been gradually worsening. Pain is described as aching; moderate. Symptoms were incited by frequent lifting and moving at work. Patient denies numbness, tingling, dropping, objects, or fall. Therapy to date includes heating pad: ineffective. Review of Systems  Constitutional: negative for fevers, chills, anorexia and weight loss  Respiratory:  negative for hemoptysis, dyspnea  CV:   negative for chest pain, palpitations, and lower extremity edema  GI:   negative for nausea, vomiting, diarrhea, abdominal pain, and melena  Endo:               +DM.  negative for polyuria,polydipsia,polyphagia, and heat intolerance  Genitourinary: negative for frequency, urgency, dysuria, retention, and hematuria  Integument:  negative for rash, ulcerations, and pruritus  Hematologic:  negative for easy bruising and bleeding  Musculoskel: negative for arthralgias, muscle weakness,and joint pain/swelling  Neurological:  negative for headaches, dizziness, vertigo,and memory/gait problems  Behavl/Psych: negative for feelings of anxiety, depression, suicide, and mood changes    Past Medical History:   Diagnosis Date    Diabetes ([address]     Hypertension      History reviewed. No pertinent surgical history. Social History     Social History    Marital status:      Spouse name: N/A    Number of children: N/A    Years of education: N/A     Social History Main Topics    Smoking status: Current Every Day Smoker     Packs/day: 1.00    Smokeless tobacco: Never Used    Alcohol use No    Drug use: No    Sexual activity: Yes     Partners: Female     Other Topics Concern    None     Social History Narrative     History reviewed. No pertinent family history. Current Outpatient Prescriptions   Medication Sig Dispense Refill    nicotine (NICODERM CQ) 21 mg/24 hr 1 Patch by TransDERmal route every twenty-four (24) hours. 30 Patch 2    diclofenac (VOLTAREN) 1 % gel Apply 2 g to affected area every six (6) hours. 100 g 2    losartan (COZAAR) 50 mg tablet Take 1 Tab by mouth daily. Indications: hypertension 30 Tab 11    cloNIDine HCl (CATAPRES) 0.1 mg tablet Take 1 Tab by mouth nightly. Indications: Smoking Cessation 30 Tab 2    metFORMIN (GLUCOPHAGE) 500 mg tablet Take 1 Tab by mouth daily (with breakfast). Indications: type 2 diabetes mellitus 60 Tab 11    atorvastatin (LIPITOR) 20 mg tablet Take 1 Tab by mouth nightly. Indications: mixed hyperlipidemia 30 Tab 11    albuterol (PROVENTIL HFA, VENTOLIN HFA, PROAIR HFA) 90 mcg/actuation inhaler Take 2 Puffs by inhalation every four (4) hours as needed for Wheezing or Shortness of Breath.  1 Inhaler 0     No Known Allergies    Objective:  Visit Vitals    /87 (BP 1 Location: Right arm, BP Patient Position: Sitting)    Pulse 86    Temp 97.8 °F (36.6 °C) (Oral)    Resp 17    Ht 5' 7\" (1.702 m)    Wt 158 lb (71.7 kg)    SpO2 98%    BMI 24.75 kg/m2     Wt Readings from Last 3 Encounters:   09/19/18 158 lb (71.7 kg)   06/27/18 150 lb (68 kg)   03/27/18 153 lb 6.4 oz (69.6 kg)     Physical Exam:   General appearance - alert, well appearing, and in no distress. Mental status - A/O x 4, normal mood and affect. Neck -Supple ,normal CSP. FROM, non-tender. No significant adenopathy/thyromegaly. No JVD. Chest -CTA. Symmetric chest rise. No rales, wheezing, or rhonchi. +congested cough. Heart - Normal rate, regular rhythm. Normal S1, S2. No MGR or clicks. Abdomen - Soft,non-distended. Normoactive BS in all quadrants. NT, no mass or HSM. Ext- Radial, DP pulses, 2+ bilaterally. No pedal edema, clubbing, or cyanosis. Skin-Warm and dry. No hyperpigmentation, ulcerations, or suspicious lesions. Neuro - Normal speech, no focal findings or movement disorder. Normal strength, gait, and muscle tone. Left Shoulder exam: No erythema, edema, or bruising. Nontender to acromioclavicular joint. Nontender to subacromial bursa. No impingement signs. +supraspinatus and trapezius TTP. AssessmentPlan:  Voltaren gel prescribed,use of heat, rest, and OTC pain meds, offered trigger pt inj, INI. Smoking cessation discussed for 3-10 minutes, pt planning to continue smoking, meds today: patches. Otherwise, The current medical regimen is effective;  continue present plan and medications. Medication Side Effects and Warnings were discussed with patient: yes   Patient Labs were reviewed: yes  Patient Past Records were reviewed: yes    See below for other orders   Follow-up Disposition:  Return in about 12 weeks (around 12/12/2018) for smok cess, HTN, left shoulder pain (inj?). ICD-10-CM ICD-9-CM    1. Essential hypertension I10 401.9    2. Cigarette nicotine dependence without complication N98.168 007.2 nicotine (NICODERM CQ) 21 mg/24 hr   3. Mild intermittent asthma without complication S61.38 754.91    4.  Acute pain of left shoulder M25.512 719.41 diclofenac (VOLTAREN) 1 % gel   5. Encounter for immunization Z23 V03.89 INFLUENZA VIRUS VAC QUAD,SPLIT,PRESV FREE SYRINGE IM     Orders Placed This Encounter    INFLUENZA VIRUS VACCINE QUADRIVALENT, PRESERVATIVE FREE SYRINGE (40641)    nicotine (NICODERM CQ) 21 mg/24 hr     Si Patch by TransDERmal route every twenty-four (24) hours. Dispense:  30 Patch     Refill:  2    diclofenac (VOLTAREN) 1 % gel     Sig: Apply 2 g to affected area every six (6) hours. Dispense:  100 g     Refill:  2       Addy Edmond expressed understanding of plan. An After Visit Summary was offered/printed and given to the patient.

## 2018-09-19 NOTE — PATIENT INSTRUCTIONS
Use heat for 20 minutes, followed by topical ointments like Aspercreme, Tiger Balm, or Capsaicin. May also use ASPERCREME with Lidocaine or Thermacare PAIN PATCHES, available over the counter. If pain continues take Tylenol arthritis up to 3 times daily for your pain. Use heat BEFORE activity/physical therapy and ice AFTER for about 20-30 minutes. Learning About Benefits From Quitting Smoking  How does quitting smoking make you healthier? If you're thinking about quitting smoking, you may have a few reasons to be smoke-free. Your health may be one of them. · When you quit smoking, you lower your risks for cancer, lung disease, heart attack, stroke, blood vessel disease, and blindness from macular degeneration. · When you're smoke-free, you get sick less often, and you heal faster. You are less likely to get colds, flu, bronchitis, and pneumonia. · As a nonsmoker, you may find that your mood is better and you are less stressed. When and how will you feel healthier? Quitting has real health benefits that start from day 1 of being smoke-free. And the longer you stay smoke-free, the healthier you get and the better you feel. The first hours  · After just 20 minutes, your blood pressure and heart rate go down. That means there's less stress on your heart and blood vessels. · Within 12 hours, the level of carbon monoxide in your blood drops back to normal. That makes room for more oxygen. With more oxygen in your body, you may notice that you have more energy than when you smoked. After 2 weeks  · Your lungs start to work better. · Your risk of heart attack starts to drop. After 1 month  · When your lungs are clear, you cough less and breathe deeper, so it's easier to be active. · Your sense of taste and smell return. That means you can enjoy food more than you have since you started smoking.   Over the years  · After 1 year, your risk of heart disease is half what it would be if you kept smoking. · After 5 years, your risk of stroke starts to shrink. Within a few years after that, it's about the same as if you'd never smoked. · After 10 years, your risk of dying from lung cancer is cut by about half. And your risk for many other types of cancer is lower too. How would quitting help others in your life? When you quit smoking, you improve the health of everyone who now breathes in your smoke. · Their heart, lung, and cancer risks drop, much like yours. · They are sick less. For babies and small children, living smoke-free means they're less likely to have ear infections, pneumonia, and bronchitis. · If you're a woman who is or will be pregnant someday, quitting smoking means a healthier . · Children who are close to you are less likely to become adult smokers. Where can you learn more? Go to http://sterlingTerralliancebharti.info/. Enter 052 [id number] [id number] in the search box to learn more about \"Learning About Benefits From Quitting Smoking. \"  Current as of: 2017  Content Version: 11.7  © 4325-3464 [employer]. Care instructions adapted under license by [employer] (which disclaims liability or warranty for this information). If you have questions about a medical condition or this instruction, always ask your healthcare professional. [place] [address] any warranty or liability for your use of this information. Deciding About Using Medicines To Quit Smoking  Deciding About Using Medicines To Quit Smoking  What are the medicines you can use? Your doctor may prescribe varenicline (Chantix) or bupropion (Zyban). These medicines can help you cope with cravings for tobacco. They are pills that don't contain nicotine. You also can use nicotine replacement products. These do contain nicotine. There are many types. · Gum and lozenges slowly release nicotine into your mouth. · Patches stick to your skin.  They slowly release nicotine into your bloodstream.  · An inhaler has a ghotra that contains nicotine. You breathe in a puff of nicotine vapor through your mouth and throat. · Nasal spray releases a mist that contains nicotine. What are key points about this decision? · Using medicines can double your chances of quitting smoking. They can ease cravings and withdrawal symptoms. · Getting counseling along with using medicine can raise your chances of quitting even more. · If you smoke fewer than 5 cigarettes a day, you may not need medicines to help you quit smoking. · These medicines have less nicotine than cigarettes. And by itself, nicotine is not nearly as harmful as smoking. The tars, carbon monoxide, and other toxic chemicals in tobacco cause the harmful effects. · The side effects of nicotine replacement products depend on the type of product. For example, a patch can make your skin red and itchy. Medicines in pill form can make you sick to your stomach. They can also cause dry mouth and trouble sleeping. For most people, the side effects are not bad enough to make them stop using the products. Why might you choose to use medicines to quit smoking? · You have tried on your own to stop smoking, but you were not able to stop. · You smoke more than 5 cigarettes a day. · You want to increase your chances of quitting smoking. · You want to reduce your cravings and withdrawal symptoms. · You feel the benefits of medicine outweigh the side effects. Why might you choose not to use medicine? · You want to try quitting on your own by stopping all at once (\"cold turkey\"). · You want to cut back slowly on the number of cigarettes you smoke. · You smoke fewer than 5 cigarettes a day. · You do not like using medicine. · You feel the side effects of medicines outweigh the benefits. · You are worried about the cost of medicines. Your decision  Thinking about the facts and your feelings can help you make a decision that is right for you.  Be sure you understand the benefits and risks of your options, and think about what else you need to do before you make the decision. Where can you learn more? Go to http://sterling-bharti.info/. Enter J163 in the search box to learn more about \"Deciding About Using Medicines To Quit Smoking. \"  Current as of: November 29, 2017  Content Version: 11.7  © 8276-0311 [employer]. Care instructions adapted under license by [employer] (which disclaims liability or warranty for this information). If you have questions about a medical condition or this instruction, always ask your healthcare professional. [place] [address] any warranty or liability for your use of this information. Shoulder Arthritis: Exercises  Your Care Instructions  Here are some examples of typical rehabilitation exercises for your condition. Start each exercise slowly. Ease off the exercise if you start to have pain. Your doctor or physical therapist will tell you when you can start these exercises and which ones will work best for you. How to do the exercises  Shoulder flexion (lying down)    To make a wand for this exercise, use a piece of PVC pipe or a broom handle with the broom removed. Make the wand about a foot wider than your shoulders. 1. Lie on your back, holding a wand with both hands. Your palms should face down as you hold the wand. 2. Keeping your elbows straight, slowly raise your arms over your head. Raise them until you feel a stretch in your shoulders, upper back, and chest.  3. Hold for 15 to 30 seconds. 4. Repeat 2 to 4 times. Shoulder rotation (lying down)    To make a wand for this exercise, use a piece of PVC pipe or a broom handle with the broom removed. Make the wand about a foot wider than your shoulders. 1. Lie on your back. Hold a wand with both hands with your elbows bent and palms up.   2. Keep your elbows close to your body, and move the wand across your body toward the sore arm. 3. Hold for 8 to 12 seconds. 4. Repeat 2 to 4 times. Shoulder internal rotation with towel    1. Hold a towel above and behind your head with the arm that is not sore. 2. With your sore arm, reach behind your back and grasp the towel. 3. With the arm above your head, pull the towel upward. Do this until you feel a stretch on the front and outside of your sore shoulder. 4. Hold 15 to 30 seconds. 5. Repeat 2 to 4 times. Shoulder blade squeeze    1. Stand with your arms at your sides, and squeeze your shoulder blades together. Do not raise your shoulders up as you squeeze. 2. Hold 6 seconds. 3. Repeat 8 to 12 times. Resisted rows    For this exercise, you will need elastic exercise material, such as surgical tubing or Thera-Band. 1. Put the band around a solid object at about waist level. (A bedpost will work well.) Each hand should hold an end of the band. 2. With your elbows at your sides and bent to 90 degrees, pull the band back. Your shoulder blades should move toward each other. Return to the starting position. 3. Repeat 8 to 12 times. External rotator strengthening exercise    1. Start by tying a piece of elastic exercise material to a doorknob. You can use surgical tubing or Thera-Band. (You may also hold one end of the band in each hand.)  2. Stand or sit with your shoulder relaxed and your elbow bent 90 degrees. Your upper arm should rest comfortably against your side. Squeeze a rolled towel between your elbow and your body for comfort. This will help keep your arm at your side. 3. Hold one end of the elastic band with the hand of the painful arm. 4. Start with your forearm across your belly. Slowly rotate the forearm out away from your body. Keep your elbow and upper arm tucked against the towel roll or the side of your body until you begin to feel tightness in your shoulder. Slowly move your arm back to where you started. 5. Repeat 8 to 12 times.   Internal rotator strengthening exercise    1. Start by tying a piece of elastic exercise material to a doorknob. You can use surgical tubing or Thera-Band. 2. Stand or sit with your shoulder relaxed and your elbow bent 90 degrees. Your upper arm should rest comfortably against your side. Squeeze a rolled towel between your elbow and your body for comfort. This will help keep your arm at your side. 3. Hold one end of the elastic band in the hand of the painful arm. 4. Slowly rotate your forearm toward your body until it touches your belly. Slowly move it back to where you started. 5. Keep your elbow and upper arm firmly tucked against the towel roll or at your side. 6. Repeat 8 to 12 times. Pendulum swing    If you have pain in your back, do not do this exercise. 1. Hold on to a table or the back of a chair with your good arm. Then bend forward a little and let your sore arm hang straight down. This exercise does not use the arm muscles. Rather, use your legs and your hips to create movement that makes your arm swing freely. 2. Use the movement from your hips and legs to guide the slightly swinging arm back and forth like a pendulum (or elephant trunk). Then guide it in circles that start small (about the size of a dinner plate). Make the circles a bit larger each day, as your pain allows. 3. Do this exercise for 5 minutes, 5 to 7 times each day. 4. As you have less pain, try bending over a little farther to do this exercise. This will increase the amount of movement at your shoulder. Follow-up care is a key part of your treatment and safety. Be sure to make and go to all appointments, and call your doctor if you are having problems. It's also a good idea to know your test results and keep a list of the medicines you take. Where can you learn more? Go to http://sterling-bharti.info/. Enter H562 in the search box to learn more about \"Shoulder Arthritis: Exercises. \"  Current as of: November 29, 2017  Content Version: 11.7  © 1865-6832 [employer]. Care instructions adapted under license by [employer] (which disclaims liability or warranty for this information). If you have questions about a medical condition or this instruction, always ask your healthcare professional. [address] any warranty or liability for your use of this information. Learning About Trigger Point Injections  What are trigger point injections? A trigger point is a painful knot in a tight band of muscle. A trigger point often causes pain to be felt in other areas, too. For example, a trigger point in the neck or upper back can cause pain in the head. Trigger point injections are shots of medicine into these knots to help relieve the pain. The medicines are usually local anesthetics like lidocaine. Trigger point injections are often part of plan that includes other treatments, such as muscle stretching and strengthening. How is a trigger point injection done? Your doctor first locates a trigger point by pressing around the painful area. This may cause your muscle to hurt or twitch. This tells the doctor that he or she has found the spot to do the injection. The area is cleaned. Your doctor then injects the medicine into the trigger point. He or she may inject the medicine in more than one direction within the trigger point. The doctor may change direction without removing the needle. If you have more than one trigger point in the muscle, your doctor may repeat the process. Your doctor may stretch the area to help the muscle relax. He or she may also show you how to move and stretch the muscle yourself. The procedure takes about 10 to 30 minutes. How long it takes depends on how many trigger points are treated. But an injection itself takes only a few moments. What can you expect after a trigger point injection? The area may feel a bit numb for a few hours.  It may also feel sore. Other problems from trigger point injections are rare. There is a chance of skin infection at the injection site. And if injections are done in the chest area, there is a small risk of puncturing the outer lining of the lung (pneumothorax). Trigger point injections may reduce some or all of your pain. But the pain can come back after the medicine wears off. If your pain comes back, your doctor may suggest more shots or other treatment for longer-lasting relief. Follow your doctor's instructions carefully. And tell your doctor about any new or unusual symptoms, such as chest pain or shortness of breath. Follow-up care is a key part of your treatment and safety. Be sure to make and go to all appointments, and call your doctor if you are having problems. It's also a good idea to know your test results and keep a list of the medicines you take. Where can you learn more? Go to http://sterling-bharti.info/. Enter [id number] in the search box to learn more about \"Learning About Trigger Point Injections. \"  Current as of: June 19, 2017  Content Version: 11.7  © 3639-4108 [employer]. Care instructions adapted under license by [employer] (which disclaims liability or warranty for this information). If you have questions about a medical condition or this instruction, always ask your healthcare professional. [address] any warranty or liability for your use of this information.

## 2018-12-12 ENCOUNTER — OFFICE VISIT (OUTPATIENT)
Dept: INTERNAL MEDICINE CLINIC | Age: 53
End: 2018-12-12

## 2018-12-12 VITALS
OXYGEN SATURATION: 98 % | DIASTOLIC BLOOD PRESSURE: 97 MMHG | HEIGHT: 67 IN | BODY MASS INDEX: 25.74 KG/M2 | WEIGHT: 164 LBS | HEART RATE: 86 BPM | TEMPERATURE: 96.8 F | SYSTOLIC BLOOD PRESSURE: 159 MMHG | RESPIRATION RATE: 17 BRPM

## 2018-12-12 DIAGNOSIS — F17.210 CIGARETTE NICOTINE DEPENDENCE WITHOUT COMPLICATION: ICD-10-CM

## 2018-12-12 DIAGNOSIS — N40.1 BENIGN PROSTATIC HYPERPLASIA WITH NOCTURIA: Primary | ICD-10-CM

## 2018-12-12 DIAGNOSIS — R53.83 FATIGUE, UNSPECIFIED TYPE: ICD-10-CM

## 2018-12-12 DIAGNOSIS — M25.512 ACUTE PAIN OF LEFT SHOULDER: ICD-10-CM

## 2018-12-12 DIAGNOSIS — R35.1 BENIGN PROSTATIC HYPERPLASIA WITH NOCTURIA: Primary | ICD-10-CM

## 2018-12-12 DIAGNOSIS — E11.29 TYPE 2 DIABETES MELLITUS WITH MICROALBUMINURIA, WITHOUT LONG-TERM CURRENT USE OF INSULIN (HCC): ICD-10-CM

## 2018-12-12 DIAGNOSIS — E78.5 HYPERLIPIDEMIA, UNSPECIFIED HYPERLIPIDEMIA TYPE: ICD-10-CM

## 2018-12-12 DIAGNOSIS — I10 ESSENTIAL HYPERTENSION: ICD-10-CM

## 2018-12-12 DIAGNOSIS — R80.9 TYPE 2 DIABETES MELLITUS WITH MICROALBUMINURIA, WITHOUT LONG-TERM CURRENT USE OF INSULIN (HCC): ICD-10-CM

## 2018-12-12 LAB
GLUCOSE POC: 202 MG/DL
HBA1C MFR BLD HPLC: 6.6 %

## 2018-12-12 RX ORDER — TAMSULOSIN HYDROCHLORIDE 0.4 MG/1
0.4 CAPSULE ORAL DAILY
Qty: 30 CAP | Refills: 11 | Status: SHIPPED | OUTPATIENT
Start: 2018-12-12

## 2018-12-12 NOTE — PROGRESS NOTES
Pt is here for   Chief Complaint   Patient presents with    Follow-up     smoking and left shoulder     Fatigue     Pt states pain level is a 0/10    1. Have you been to the ER, urgent care clinic since your last visit? Hospitalized since your last visit? No    2. Have you seen or consulted any other health care providers outside of the [address] [place] [address] since your last visit? Include any pap smears or colon screening.  No    VORB for POCT given per HAJA LopezC

## 2018-12-12 NOTE — PROGRESS NOTES
Pau Schmidt is a 46 y.o. male and presents with Follow-up (smoking and left shoulder ) and Fatigue    Subjective:  BPH Review:  Pt reports nocturia x 3, with interrupted sleep. Falling asleep at various intervals throughout the day. Wife denies snoring and pt without headaches recently. No h/o sleep apnea reported. NO  urgency, dribbling, burning on urination, or dysuria. No meds prescribed currently, new issue. Hypertension Review:  The patient has hypertension  Diet and Lifestyle: generally does try to follow a  low sodium diet,  Exercises some. Home BP Monitoring: is NOT measured at home  Pertinent ROS: taking medications as instructed, no medication side effects noted. No TIA's, chest pain on exertion, dyspnea on exertion, or swelling of ankles. BP Readings from Last 3 Encounters:   12/12/18 (!) 159/97   09/19/18 142/87   06/27/18 (!) 151/92     Shoulder pain improved, Pain Scale: 0 - No pain/10   Continues to smoke 1 PPD. Diabetes Review:  male has diet- controlled/stable diabetes  Diabetic ROS -negative for polyuria,polydipsia,polyphagia, and heat intolerance  Medication compliance: good  Home glucose monitoring: none  Diabetic diet compliance: compliant MOST of the time,   Prior visit with dietician: NO  Exercises sporadically  Weight: wt gain since last OV  Wt Readings from Last 3 Encounters:   12/12/18 164 lb (74.4 kg)   09/19/18 158 lb (71.7 kg)   06/27/18 150 lb (68 kg)     Review of Systems  Constitutional: negative for fevers, chills, anorexia and weight loss  Respiratory:  negative for hemoptysis, dyspnea  CV:   negative for chest pain, palpitations, and lower extremity edema  GI:   negative for nausea, vomiting, diarrhea, abdominal pain, and melena  Endo:               +DM.  negative for polyuria,polydipsia,polyphagia, and heat intolerance  Genitourinary: negative for frequency, urgency, dysuria, retention, and hematuria  Integument:  negative for rash, ulcerations, and pruritus  Hematologic:  negative for easy bruising and bleeding  Musculoskel: negative for arthralgias, muscle weakness,and joint pain/swelling  Neurological:  negative for headaches, dizziness, vertigo,and memory/gait problems  Behavl/Psych: negative for feelings of anxiety, depression, suicide, and mood changes    Past Medical History:   Diagnosis Date    Diabetes ([facility] [address]     Hypertension      History reviewed. No pertinent surgical history. Social History     Socioeconomic History    Marital status:      Spouse name: Not on file    Number of children: Not on file    Years of education: Not on file    Highest education level: Not on file   Tobacco Use    Smoking status: Current Every Day Smoker     Packs/day: 1.00    Smokeless tobacco: Never Used   Substance and Sexual Activity    Alcohol use: No    Drug use: No    Sexual activity: Yes     Partners: Female     History reviewed. No pertinent family history. Current Outpatient Medications   Medication Sig Dispense Refill    tamsulosin (FLOMAX) 0.4 mg capsule Take 1 Cap by mouth daily. 30 Cap 11    nicotine (NICODERM CQ) 21 mg/24 hr 1 Patch by TransDERmal route every twenty-four (24) hours. 30 Patch 2    atorvastatin (LIPITOR) 20 mg tablet Take 1 Tab by mouth nightly. Indications: mixed hyperlipidemia 30 Tab 11    losartan (COZAAR) 50 mg tablet Take 1 Tab by mouth daily. Indications: hypertension 30 Tab 11    cloNIDine HCl (CATAPRES) 0.1 mg tablet Take 1 Tab by mouth nightly. Indications: Smoking Cessation 30 Tab 2    metFORMIN (GLUCOPHAGE) 500 mg tablet Take 1 Tab by mouth daily (with breakfast). Indications: type 2 diabetes mellitus 60 Tab 11    diclofenac (VOLTAREN) 1 % gel Apply 2 g to affected area every six (6) hours. 100 g 2    albuterol (PROVENTIL HFA, VENTOLIN HFA, PROAIR HFA) 90 mcg/actuation inhaler Take 2 Puffs by inhalation every four (4) hours as needed for Wheezing or Shortness of Breath.  1 Inhaler 0     No Known Allergies    Objective:  Visit Vitals  BP (!) 159/97 (BP 1 Location: Left arm, BP Patient Position: Sitting)   Pulse 86   Temp 96.8 °F (36 °C) (Oral)   Resp 17   Ht 5' 7\" (1.702 m)   Wt 164 lb (74.4 kg)   SpO2 98%   BMI 25.69 kg/m²     Wt Readings from Last 3 Encounters:   12/12/18 164 lb (74.4 kg)   09/19/18 158 lb (71.7 kg)   06/27/18 150 lb (68 kg)     Physical Exam:   General appearance - alert, well appearing, and in no distress. Mental status - A/O x 4, normal mood and affect. Neck -Supple ,normal CSP. FROM, non-tender. No significant adenopathy/thyromegaly. No JVD. Chest -CTA. Symmetric chest rise. No rales, wheezing, or rhonchi. Heart - Normal rate, regular rhythm. Normal S1, S2. No MGR or clicks. Abdomen - Soft,non-distended. Normoactive BS in all quadrants. NT, no mass or HSM. Ext- Radial, DP pulses, 2+ bilaterally. No pedal edema, clubbing, or cyanosis. Skin-Warm and dry. No hyperpigmentation, ulcerations, or suspicious lesions. Neuro - Normal speech, no focal findings or movement disorder. Normal strength, gait, and muscle tone. AssessmentPlan:  Trial of Flomax for nocturia to help improve sleep and fatigue. Labs deferred since pt self pay at this time. Medication Side Effects and Warnings were discussed with patient: yes   Patient Labs were reviewed: yes  Patient Past Records were reviewed: yes    See below for other orders   Follow-up Disposition:  Return in about 4 weeks (around 1/9/2019) for BPH med start, HTN f/u. ICD-10-CM ICD-9-CM    1. Benign prostatic hyperplasia with nocturia N40.1 600.01     R35.1 788.43    2. Essential hypertension I10 401.9 AMB POC HEMOGLOBIN A1C      AMB POC GLUCOSE BLOOD, BY GLUCOSE MONITORING DEVICE   3. Hyperlipidemia, unspecified hyperlipidemia type E78.5 272.4 AMB POC HEMOGLOBIN A1C      AMB POC GLUCOSE BLOOD, BY GLUCOSE MONITORING DEVICE   4. Cigarette nicotine dependence without complication S88.086 504.2    5.  Acute pain of left shoulder M25.512 719.41    6. Type 2 diabetes mellitus with microalbuminuria, without long-term current use of insulin ([facility]) E11.29 250.40     R80.9 791.0    7. Fatigue, unspecified type R53.83 780.79      Orders Placed This Encounter    AMB POC HEMOGLOBIN A1C    AMB POC GLUCOSE BLOOD, BY GLUCOSE MONITORING DEVICE    tamsulosin (FLOMAX) 0.4 mg capsule     Sig: Take 1 Cap by mouth daily. Dispense:  30 Cap     Refill:  [address] expressed understanding of plan. An After Visit Summary was offered/printed and given to the patient.

## 2019-01-09 ENCOUNTER — OFFICE VISIT (OUTPATIENT)
Dept: INTERNAL MEDICINE CLINIC | Age: 54
End: 2019-01-09

## 2019-01-09 VITALS
RESPIRATION RATE: 18 BRPM | HEART RATE: 77 BPM | WEIGHT: 163.6 LBS | DIASTOLIC BLOOD PRESSURE: 95 MMHG | TEMPERATURE: 97.1 F | OXYGEN SATURATION: 95 % | BODY MASS INDEX: 25.68 KG/M2 | HEIGHT: 67 IN | SYSTOLIC BLOOD PRESSURE: 156 MMHG

## 2019-01-09 DIAGNOSIS — F17.210 CIGARETTE NICOTINE DEPENDENCE WITHOUT COMPLICATION: ICD-10-CM

## 2019-01-09 DIAGNOSIS — R53.83 FATIGUE, UNSPECIFIED TYPE: ICD-10-CM

## 2019-01-09 DIAGNOSIS — R40.0 HAS DAYTIME DROWSINESS: ICD-10-CM

## 2019-01-09 DIAGNOSIS — I10 ESSENTIAL HYPERTENSION: Primary | ICD-10-CM

## 2019-01-09 DIAGNOSIS — R35.1 BENIGN PROSTATIC HYPERPLASIA WITH NOCTURIA: ICD-10-CM

## 2019-01-09 DIAGNOSIS — N40.1 BENIGN PROSTATIC HYPERPLASIA WITH NOCTURIA: ICD-10-CM

## 2019-01-09 NOTE — PROGRESS NOTES
Sravani Arzola is a 46 y.o. male and presents with Follow-up (BPH med start and HTN)    Subjective:  Pt here to f/u BPH and HTN. BPH Review:  Pt reports nocturia x 1. Continues to fall asleep at various intervals throughout the day. Awakens some days with SOB and feeling exhausted. Wife denies snoring and pt without headaches recently. No h/o sleep apnea reported. NO  urgency, dribbling, burning on urination, or dysuria. Taking [address] med as prescribed. Hypertension Review:  The patient has hypertension  Diet and Lifestyle: generally does try to follow a  low sodium diet,  Exercises some. Home BP Monitoring: is NOT measured at home  Pertinent ROS: taking medications as instructed, no medication side effects noted. No TIA's, chest pain on exertion, dyspnea on exertion, or swelling of ankles. No HAs recently. BP Readings from Last 3 Encounters:   01/09/19 (!) 156/95   12/12/18 (!) 159/97   09/19/18 142/87      Continues to smoke 1 PPD. Intermittent cough. Review of Systems  Constitutional: negative for fevers, chills, anorexia and weight loss  Respiratory:  negative for hemoptysis, dyspnea  CV:   negative for chest pain, palpitations, and lower extremity edema  GI:   negative for nausea, vomiting, diarrhea, abdominal pain, and melena  Endo:               +DM. negative for polyuria,polydipsia,polyphagia, and heat intolerance  Genitourinary: negative for frequency, urgency, dysuria, retention, and hematuria  Integument:  negative for rash, ulcerations, and pruritus  Hematologic:  negative for easy bruising and bleeding  Musculoskel: negative for arthralgias, muscle weakness,and joint pain/swelling  Neurological:  negative for headaches, dizziness, vertigo,and memory/gait problems  Behavl/Psych: negative for feelings of anxiety, depression, suicide, and mood changes    Past Medical History:   Diagnosis Date    Diabetes ([facility] [address]     Hypertension      History reviewed. No pertinent surgical history.   Social History     Socioeconomic History    Marital status:      Spouse name: Not on file    Number of children: Not on file    Years of education: Not on file    Highest education level: Not on file   Tobacco Use    Smoking status: Current Every Day Smoker     Packs/day: 1.00    Smokeless tobacco: Never Used   Substance and Sexual Activity    Alcohol use: No    Drug use: No    Sexual activity: Yes     Partners: Female     History reviewed. No pertinent family history. Current Outpatient Medications   Medication Sig Dispense Refill    tamsulosin (FLOMAX) 0.4 mg capsule Take 1 Cap by mouth daily. 30 Cap 11    nicotine (NICODERM CQ) 21 mg/24 hr 1 Patch by TransDERmal route every twenty-four (24) hours. 30 Patch 2    diclofenac (VOLTAREN) 1 % gel Apply 2 g to affected area every six (6) hours. 100 g 2    atorvastatin (LIPITOR) 20 mg tablet Take 1 Tab by mouth nightly. Indications: mixed hyperlipidemia 30 Tab 11    losartan (COZAAR) 50 mg tablet Take 1 Tab by mouth daily. Indications: hypertension 30 Tab 11    cloNIDine HCl (CATAPRES) 0.1 mg tablet Take 1 Tab by mouth nightly. Indications: Smoking Cessation 30 Tab 2    metFORMIN (GLUCOPHAGE) 500 mg tablet Take 1 Tab by mouth daily (with breakfast). Indications: type 2 diabetes mellitus 60 Tab 11    albuterol (PROVENTIL HFA, VENTOLIN HFA, PROAIR HFA) 90 mcg/actuation inhaler Take 2 Puffs by inhalation every four (4) hours as needed for Wheezing or Shortness of Breath. 1 Inhaler 0     No Known Allergies    Objective:  Visit Vitals  BP (!) 156/95 (BP 1 Location: Left arm, BP Patient Position: Sitting)   Pulse 77   Temp 97.1 °F (36.2 °C) (Oral)   Resp 18   Ht 5' 7\" (1.702 m)   Wt 163 lb 9.6 oz (74.2 kg)   SpO2 95%   BMI 25.62 kg/m²     Wt Readings from Last 3 Encounters:   01/09/19 163 lb 9.6 oz (74.2 kg)   12/12/18 164 lb (74.4 kg)   09/19/18 158 lb (71.7 kg)     Physical Exam:   General appearance - alert, well appearing, and in no distress.  +raspy voice. +malaise. Mental status - A/O x 4, normal mood and affect. Neck -Supple ,normal CSP. FROM, non-tender. No significant adenopathy/thyromegaly. No JVD. Chest Rhonchi in all lung fields. Symmetric chest rise. No wheezing or rales. Heart - Normal rate, regular rhythm. Normal S1, S2. No MGR or clicks. Abdomen - Soft,non-distended. Normoactive BS in all quadrants. NT, no mass or HSM. Ext- Radial, DP pulses, 2+ bilaterally. No pedal edema, clubbing, or cyanosis. Skin-Warm and dry. No hyperpigmentation, ulcerations, or suspicious lesions. Neuro - Normal speech, no focal findings or movement disorder. Normal strength, gait, and muscle tone. AssessmentPlan:  Referred to sleep med to r/o sleep apnea. Advise use of supplements to boost energy. Smoking cessation discussed for 3-10 minutes, pt planning to continue smoking, no meds today. Medication Side Effects and Warnings were discussed with patient: yes   Patient Labs were reviewed: yes  Patient Past Records were reviewed: yes    See below for other orders   Follow-up Disposition:  Return in about 5 months (around 6/9/2019) for DM, HTN, BPH with labs. HTN and COPD?/ASTHMA  in 1 month. ICD-10-CM ICD-9-CM    1. Essential hypertension I10 401.9 REFERRAL TO SLEEP STUDIES   2. Has daytime drowsiness R40.0 780.09 REFERRAL TO SLEEP STUDIES   3. Benign prostatic hyperplasia with nocturia N40.1 600.01     R35.1 788.43    4. Fatigue, unspecified type R53.83 780.79    5. Cigarette nicotine dependence without complication O89.846 243.2      Orders Placed This Encounter    REFERRAL TO SLEEP STUDIES     Referral Priority:   Routine     Referral Type:   Consultation     Referral Reason:   Specialty Services Required     [facility] Location:   [facility]     Referred to Provider:   Stewart Godoy MD     Number of Visits Requested:   1       Ed Delay expressed understanding of plan.  An After Visit Summary was offered/printed and given to the patient.

## 2019-01-09 NOTE — PROGRESS NOTES
Pt is here for   Chief Complaint   Patient presents with    Follow-up     BPH med start and HTN         Pt denies pain at this time     1. Have you been to the ER, urgent care clinic since your last visit? Hospitalized since your last visit? No    2. Have you seen or consulted any other health care providers outside of the [address] [place] [address] since your last visit? Include any pap smears or colon screening.  No

## 2019-01-09 NOTE — PATIENT INSTRUCTIONS
Sleep Studies: About This Test  What is it? Sleep studies are tests that watch what happens to your body during sleep. These studies usually are done in a sleep lab. Sleep labs are often located in hospitals. But sleep studies also can be done with portable equipment that you use at home. Why is this test done? Sleep studies are done to find sleep problems, including:  · Sleep apnea or excessive snoring. · Narcolepsy. · Nocturnal seizures. · REM behavior disorder (RBD). · Repeated muscle twitching of the feet, arms, or legs while you sleep. How can you prepare for the test?  · You may be asked to keep a sleep diary for 1 to 2 weeks before your sleep study. · Don't take any naps for 2 to 3 days before your test.  · You may be asked to avoid food or drinks with caffeine for a day or two before your test.  · Take a shower or bath before your test, but don't use sprays, oils, or gels on your hair. Don't wear makeup, fingernail polish, or fake nails. · Pack and take along a small overnight bag with personal items, such as a toothbrush, a comb, favorite pillows or blankets, and a book. You can wear your own nightclothes. What happens during the test?  · In the sleep lab, you will be in a private room, much like a hotel room. · Small pads or patches called electrodes will be placed on your head and body with a small amount of glue and tape. These will record things like brain activity, eye movement, oxygen levels, and snoring. · Soft elastic belts will be placed around your chest and belly to measure your breathing. · Your blood oxygen levels will be checked by a small clip (oximeter) placed either on the tip of your index finger or on your earlobe. · If you have sleep apnea, you may wear a mask that is connected to a continuous positive airway pressure (CPAP) machine.   · Depending on the type of test, you will be allowed to sleep through the night or you will be awakened periodically and asked to stay awake for a while. What else should you know about the test?  · It may feel odd to be hooked to the sleep study equipment. The sleep lab technician understands that your sleep may not be the same as it is at home because of the equipment. Try to relax and make yourself as comfortable as possible. · After your sleep problem has been identified, you may need a second study if your doctor orders treatment such as CPAP. · Portable sleep study equipment is available for a person to do sleep studies at home. This may be a choice for people who have problems sleeping in a sleep lab. But home sleep studies may not give the same results as a sleep lab. How long does the test take? · You will stay in the sleep lab overnight. For some tests, you will also stay part of the next day. What happens after the test?  · You will be able to go home right away. · You can go back to your usual activities right away. Follow-up care is a key part of your treatment and safety. Be sure to make and go to all appointments, and call your doctor if you are having problems. It's also a good idea to keep a list of the medicines you take. Ask your doctor when you can expect to have your test results. Where can you learn more? Go to http://sterling-bharti.info/. Enter O323 in the search box to learn more about \"Sleep Studies: About This Test.\"  Current as of: December 6, 2017  Content Version: 11.8  © 1318-7175 [employer]. Care instructions adapted under license by [employer] (which disclaims liability or warranty for this information). If you have questions about a medical condition or this instruction, always ask your healthcare professional. [place] [address] any warranty or liability for your use of this information.          Sleep Apnea: Care Instructions  Your Care Instructions    Sleep apnea means that you frequently stop breathing for 10 seconds or longer during sleep. It can be mild to severe, based on the number of times an hour that you stop breathing or have slowed breathing. Blocked or narrowed airways in your nose, mouth, or throat can cause sleep apnea. Your airway can become blocked when your throat muscles and tongue relax during sleep. You can treat sleep apnea at home by making lifestyle changes. You also can use a CPAP breathing machine that keeps tissues in the throat from blocking your airway. Or your doctor may suggest that you use a breathing device while you sleep. It helps keep your airway open. This could be a device that you put in your mouth. In some cases, surgery may be needed to remove enlarged tissues in the throat. Follow-up care is a key part of your treatment and safety. Be sure to make and go to all appointments, and call your doctor if you are having problems. It's also a good idea to know your test results and keep a list of the medicines you take. How can you care for yourself at home? · Lose weight, if needed. It may reduce the number of times you stop breathing or have slowed breathing. · Sleep on your side. It may stop mild apnea. If you tend to roll onto your back, sew a pocket in the back of your paIntertwine top. Put a tennis ball into the pocket, and stitch the pocket shut. This will help keep you from sleeping on your back. · Avoid alcohol and medicines such as sleeping pills and sedatives before bed. · Do not smoke. Smoking can make sleep apnea worse. If you need help quitting, talk to your doctor about stop-smoking programs and medicines. These can increase your chances of quitting for good. · Prop up the head of your bed 4 to 6 inches by putting bricks under the legs of the bed. · Treat breathing problems, such as a stuffy nose, caused by a cold or allergies. · Try a continuous positive airway pressure (CPAP) breathing machine if your doctor recommends it. The machine keeps your airway open when you sleep.   · If CPAP does not work for you, ask your doctor if you can try other breathing machines. A bilevel positive airway pressure machine uses one type of air pressure for breathing in and another type for breathing out. Another device raises or lowers air pressure as needed while you breathe. · Talk to your doctor if:  ? Your nose feels dry or bleeds when you use one of these machines. You may need to increase moisture in the air. A humidifier may help. ? Your nose is runny or stuffy from using a breathing machine. Decongestants or a corticosteroid nasal spray may help. ? You are sleepy during the day and it gets in the way of the normal things you do. Do not drive when you are drowsy. When should you call for help? Watch closely for changes in your health, and be sure to contact your doctor if:    · You still have sleep apnea even though you have made lifestyle changes.     · You are thinking of trying a device such as CPAP.     · You are having problems using a CPAP or similar machine. Where can you learn more? Go to http://sterling-bharti.info/. Enter R078 in the search box to learn more about \"Sleep Apnea: Care Instructions. \"  Current as of: December 6, 2017  Content Version: 11.8  © 4293-8381 [employer]. Care instructions adapted under license by [employer] (which disclaims liability or warranty for this information). If you have questions about a medical condition or this instruction, always ask your healthcare professional. [place] [address] any warranty or liability for your use of this information.

## 2019-03-12 DIAGNOSIS — E11.9 TYPE 2 DIABETES MELLITUS WITHOUT COMPLICATION, WITHOUT LONG-TERM CURRENT USE OF INSULIN (HCC): ICD-10-CM

## 2019-03-12 DIAGNOSIS — J45.21 MILD INTERMITTENT ASTHMA WITH ACUTE EXACERBATION: ICD-10-CM

## 2019-03-12 DIAGNOSIS — E78.5 HYPERLIPIDEMIA, UNSPECIFIED HYPERLIPIDEMIA TYPE: ICD-10-CM

## 2019-03-12 DIAGNOSIS — F17.210 CIGARETTE NICOTINE DEPENDENCE WITHOUT COMPLICATION: ICD-10-CM

## 2019-03-12 DIAGNOSIS — I10 ESSENTIAL HYPERTENSION: ICD-10-CM

## 2019-03-12 RX ORDER — ATORVASTATIN CALCIUM 20 MG/1
20 TABLET, FILM COATED ORAL
Qty: 30 TAB | Refills: 11 | Status: SHIPPED | OUTPATIENT
Start: 2019-03-12

## 2019-03-12 RX ORDER — METFORMIN HYDROCHLORIDE 500 MG/1
500 TABLET ORAL
Qty: 60 TAB | Refills: 11 | Status: SHIPPED | OUTPATIENT
Start: 2019-03-12

## 2019-03-12 RX ORDER — CLONIDINE HYDROCHLORIDE 0.1 MG/1
0.1 TABLET ORAL
Qty: 30 TAB | Refills: 2 | Status: SHIPPED | OUTPATIENT
Start: 2019-03-12

## 2019-03-12 RX ORDER — LOSARTAN POTASSIUM 50 MG/1
50 TABLET ORAL DAILY
Qty: 30 TAB | Refills: 11 | Status: SHIPPED | OUTPATIENT
Start: 2019-03-12

## 2019-03-12 RX ORDER — METHOCARBAMOL 500 MG/1
500 TABLET, FILM COATED ORAL
Qty: 20 TAB | Refills: 0 | Status: SHIPPED | OUTPATIENT
Start: 2019-03-12 | End: 2019-09-12 | Stop reason: SDUPTHER

## 2019-03-12 RX ORDER — ALBUTEROL SULFATE 90 UG/1
2 AEROSOL, METERED RESPIRATORY (INHALATION)
Qty: 1 INHALER | Refills: 0 | Status: SHIPPED | OUTPATIENT
Start: 2019-03-12

## 2019-06-12 ENCOUNTER — OFFICE VISIT (OUTPATIENT)
Dept: INTERNAL MEDICINE CLINIC | Age: 54
End: 2019-06-12

## 2019-06-12 VITALS
HEIGHT: 67 IN | DIASTOLIC BLOOD PRESSURE: 86 MMHG | HEART RATE: 84 BPM | RESPIRATION RATE: 17 BRPM | TEMPERATURE: 98 F | SYSTOLIC BLOOD PRESSURE: 145 MMHG | BODY MASS INDEX: 24.48 KG/M2 | OXYGEN SATURATION: 99 % | WEIGHT: 156 LBS

## 2019-06-12 DIAGNOSIS — Z13.228 SCREENING FOR ENDOCRINE, NUTRITIONAL, METABOLIC AND IMMUNITY DISORDER: ICD-10-CM

## 2019-06-12 DIAGNOSIS — N40.1 BENIGN PROSTATIC HYPERPLASIA WITH NOCTURIA: ICD-10-CM

## 2019-06-12 DIAGNOSIS — Z13.0 SCREENING FOR ENDOCRINE, NUTRITIONAL, METABOLIC AND IMMUNITY DISORDER: ICD-10-CM

## 2019-06-12 DIAGNOSIS — Z13.29 SCREENING FOR ENDOCRINE, NUTRITIONAL, METABOLIC AND IMMUNITY DISORDER: ICD-10-CM

## 2019-06-12 DIAGNOSIS — R35.1 BENIGN PROSTATIC HYPERPLASIA WITH NOCTURIA: ICD-10-CM

## 2019-06-12 DIAGNOSIS — I10 ESSENTIAL HYPERTENSION: ICD-10-CM

## 2019-06-12 DIAGNOSIS — R80.9 TYPE 2 DIABETES MELLITUS WITH MICROALBUMINURIA, WITHOUT LONG-TERM CURRENT USE OF INSULIN (HCC): Primary | ICD-10-CM

## 2019-06-12 DIAGNOSIS — Z13.21 SCREENING FOR ENDOCRINE, NUTRITIONAL, METABOLIC AND IMMUNITY DISORDER: ICD-10-CM

## 2019-06-12 DIAGNOSIS — Z12.5 PROSTATE CANCER SCREENING: ICD-10-CM

## 2019-06-12 DIAGNOSIS — E78.5 HYPERLIPIDEMIA, UNSPECIFIED HYPERLIPIDEMIA TYPE: ICD-10-CM

## 2019-06-12 DIAGNOSIS — E11.29 TYPE 2 DIABETES MELLITUS WITH MICROALBUMINURIA, WITHOUT LONG-TERM CURRENT USE OF INSULIN (HCC): Primary | ICD-10-CM

## 2019-06-12 RX ORDER — LANCETS
EACH MISCELLANEOUS
Qty: 100 EACH | Refills: 11 | Status: SHIPPED | OUTPATIENT
Start: 2019-06-12 | End: 2019-06-12 | Stop reason: SDUPTHER

## 2019-06-12 RX ORDER — LANCETS
EACH MISCELLANEOUS
Qty: 100 EACH | Refills: 11 | Status: SHIPPED | OUTPATIENT
Start: 2019-06-12

## 2019-06-12 RX ORDER — INSULIN PUMP SYRINGE, 3 ML
EACH MISCELLANEOUS
Qty: 1 KIT | Refills: 0 | Status: SHIPPED | OUTPATIENT
Start: 2019-06-12 | End: 2019-06-12 | Stop reason: SDUPTHER

## 2019-06-12 RX ORDER — INSULIN PUMP SYRINGE, 3 ML
EACH MISCELLANEOUS
Qty: 1 KIT | Refills: 0 | Status: SHIPPED | OUTPATIENT
Start: 2019-06-12

## 2019-06-12 RX ORDER — ASPIRIN 81 MG/1
81 TABLET ORAL DAILY
Qty: 30 TAB | Refills: 11 | Status: SHIPPED | OUTPATIENT
Start: 2019-06-12

## 2019-06-12 NOTE — PROGRESS NOTES
Pt Is here for   Chief Complaint   Patient presents with    Follow-up     HTN, DM, CHOL     Pt denies pain at this time    1. Have you been to the ER, urgent care clinic since your last visit? Hospitalized since your last visit? No    2. Have you seen or consulted any other health care providers outside of the [address] [place] [address] since your last visit? Include any pap smears or colon screening.  No

## 2019-06-12 NOTE — PATIENT INSTRUCTIONS
Diabetic Retinopathy: Care Instructions  Your Care Instructions    Diabetes can damage the small blood vessels in part of your eye. This part of the eye is called the retina. It detects light that enters the eye. Then it sends signals to your brain about what the eye sees. When this type of eye damage happens, it's called diabetic retinopathy. It can lead to poor vision and even blindness. But if you keep your blood sugar and blood pressure levels in your target range, you can help avoid or slow the damage. Follow-up care is a key part of your treatment and safety. Be sure to make and go to all appointments, and call your doctor if you are having problems. It's also a good idea to know your test results and keep a list of the medicines you take. How can you care for yourself at home? · Have regular eye exams. Tell your doctor about any changes in your vision. · Keep blood sugar in a target range. ? Eat a variety of healthy foods, and spread carbohydrate throughout the day. You may want to have a dietitian help you plan meals. ? If your doctor recommends it, get more exercise. Walking is a good choice. Bit by bit, increase the amount you walk every day. Try for at least 30 minutes on most days of the week. ? Be safe with medicines. Take your medicine exactly as prescribed. Call your doctor if you think you are having a problem with your medicine. ? Check your blood sugar as often as your doctor recommends. · Eat a low-salt diet. This may help keep your blood pressure at a normal level. You may also need to take medicines to reach your goals. · Do not smoke. Smoking can make this condition worse. If you need help quitting, talk to your doctor about stop-smoking programs and medicines. These can increase your chances of quitting for good. When should you call for help?   Call your doctor now or seek immediate medical care if:    · You have vision changes.    Watch closely for changes in your health, and be sure to contact your doctor if:    · You do not get better as expected. Where can you learn more? Go to http://sterling-bharti.info/. Enter R396 in the search box to learn more about \"Diabetic Retinopathy: Care Instructions. \"  Current as of: July 25, 2018  Content Version: 11.9  © 2006-2018 [employer]. Care instructions adapted under license by [employer] (which disclaims liability or warranty for this information). If you have questions about a medical condition or this instruction, always ask your healthcare professional. [address] any warranty or liability for your use of this information. Diabetes and Alcohol: Care Instructions  Your Care Instructions    People who have diabetes need to be more careful with alcohol. Before you drink, consider a few things: Is your diabetes well controlled? Do you know how drinking alcohol can affect you? Do you have high blood pressure, nerve damage, or eye problems from your diabetes? If you take insulin or another medicine for diabetes, drinking alcohol may cause low blood sugar. This could cause dangerous low blood sugar levels. Too much alcohol can also affect your ability to know your blood sugar is low and to treat it. Drinking alcohol can make you lightheaded at first and drowsy as you drink more, both of which may be similar to the symptoms of low blood sugar. Drinking a lot of alcohol over a long period of time can damage your liver (cirrhosis). If this happens, your body may lose its natural response to protect itself from low blood sugar. If you are controlling your diabetes and do not have other health issues, it may be okay to have a drink once in a while. Learning how alcohol affects your body can help you make the right choices. Follow-up care is a key part of your treatment and safety. Be sure to make and go to all appointments, and call your doctor if you are having problems. It's also a good idea to know your test results and keep a list of the medicines you take. How can you care for yourself at home? If you drink  · Work with your doctor or other diabetes expert to find what is best for you. Make sure you know whether it is safe to drink if you are taking insulin or another medicine for diabetes. · In general, limit alcohol to 1 drink a day with a meal if you are a woman. If you are a man, limit alcohol to 2 drinks a day with a meal. The following is considered a standard drink:  ? One 12-ounce bottle of beer or wine cooler  ? One 5-ounce glass of wine  ? One mixed drink with 1.5 ounces of 80-proof hard liquor, such as gin, whiskey, or rum  · Choose alcoholic drinks wisely. With hard alcohol, use sugar-free mixers, such as diet tonic, water, or club soda. Pick drinks that have less alcohol, including light beer or dry wine. Or add club soda to wine to dilute it. Also remember that most alcoholic drinks have a lot of calories. · When you drink, check your blood sugar before you go to bed. Have a snack before bed so your blood sugar does not drop while you sleep. When not to drink  · Never drink on an empty stomach. If you do drink alcohol, drink it only with a meal or snack. Having as little as 2 drinks on an empty stomach could lead to low blood sugar. · Do not drink alcohol if you have problems recognizing the signs of low blood sugar until they become severe. · Do not drink alcohol after you exercise. The exercise itself lowers blood sugar. · Do not drink if you have nerve damage. Drinking can make it worse and increase the pain, numbness, and other symptoms. · Do not drink if you have high blood pressure. · Do not drink if you have diabetic eye disease. · Do not drink if you have high triglycerides, a type of fat in your blood. Drinking can raise triglycerides. · Do not drink if you are trying to lose weight.  Alcohol provides empty calories that do not give you any nutrients. · Do not drink and drive. The effects of alcohol are greater if you have low blood sugar. When should you call for help? Call 911 anytime you think you may need emergency care. For example, call if:    · You passed out (lost consciousness).     · You are confused or cannot think clearly.     · Your blood sugar is very high or very low.    Watch closely for changes in your health, and be sure to contact your doctor if:    · Your blood sugar stays outside the level your doctor set for you.     · You have any problems. Where can you learn more? Go to http://sterling-bharti.info/. Enter T236 in the search box to learn more about \"Diabetes and Alcohol: Care Instructions. \"  Current as of: July 25, 2018  Content Version: 11.9  © 3293-3203 [employer]. Care instructions adapted under license by [employer] (which disclaims liability or warranty for this information). If you have questions about a medical condition or this instruction, always ask your healthcare professional. [address] any warranty or liability for your use of this information.

## 2019-06-12 NOTE — PROGRESS NOTES
Leah Fish is a 53 y.o. male and presents with Follow-up (HTN, DM, CHOL)    Subjective:  BPH Review:  Pt reports nocturia x 2, improved some. He has no burning on urination, dysuria, urgency, and dribbling. Reports taking meds. Hypertension Review:  The patient has hypertension  Diet and Lifestyle: generally does try to follow a  low sodium diet,  Exercises rarely. Home BP Monitoring: is NOT measured at home  Pertinent ROS: taking medications as instructed, no medication side effects noted. No TIA's, chest pain on exertion, dyspnea on exertion, or swelling of ankles. Continues to smoke after each meal and drinks about 2 beers NIGHTLY. BP Readings from Last 3 Encounters:   06/12/19 145/86   01/09/19 (!) 156/95   12/12/18 (!) 159/97     Diabetes Review:  male has diet- controlled/stable diabetes  Diabetic ROS -negative for polyuria,polydipsia,polyphagia, and heat intolerance  Medication compliance: good  Home glucose monitoring: none  Diabetic diet compliance: compliant MOST of the time,   Prior visit with dietician: NO  Exercises sporadically  Weight: wt gain since last OV  Wt Readings from Last 3 Encounters:   06/12/19 156 lb (70.8 kg)   01/09/19 163 lb 9.6 oz (74.2 kg)   12/12/18 164 lb (74.4 kg)     Dyslipidemia Review:  Patient presents for evaluation of lipids. Compliance with treatment thus far has been good. Denies myalgias, slurring speech, unilateral weakness, and chest pain. A repeat fasting lipid profile was done/ordered. The patient does NOT use medications that may worsen dyslipidemias (corticosteroids, progestins, anabolic steroids, diuretics, beta-blockers, amiodarone, cyclosporine, olanzapine). The patient does not exercise regularly. The patient is not known to have coexisting coronary artery disease.  NOT taking Aspirin daily as prescribed/advised    Review of Systems  Constitutional: negative for fevers, chills, anorexia and weight loss  Respiratory:  negative for hemoptysis, dyspnea  CV:   negative for chest pain, palpitations, and lower extremity edema  GI:   negative for nausea, vomiting, diarrhea, abdominal pain, and melena  Endo:               negative for polyuria,polydipsia,polyphagia, and heat intolerance  Genitourinary: negative for frequency, urgency, dysuria, retention, and hematuria  Integument:  negative for rash, ulcerations, and pruritus  Hematologic:  negative for easy bruising and bleeding  Musculoskel: negative for arthralgias, muscle weakness,and joint pain/swelling  Neurological:  negative for headaches, dizziness, vertigo,and memory/gait problems  Behavl/Psych: negative for feelings of anxiety, depression, suicide, and mood changes    Past Medical History:   Diagnosis Date    Diabetes ([facility] [address]     Hypertension      History reviewed. No pertinent surgical history. Social History     Socioeconomic History    Marital status:      Spouse name: Not on file    Number of children: Not on file    Years of education: Not on file    Highest education level: Not on file   Tobacco Use    Smoking status: Current Every Day Smoker     Packs/day: 1.00    Smokeless tobacco: Never Used   Substance and Sexual Activity    Alcohol use: No    Drug use: No    Sexual activity: Yes     Partners: Female     History reviewed. No pertinent family history. Current Outpatient Medications   Medication Sig Dispense Refill    atorvastatin (LIPITOR) 20 mg tablet Take 1 Tab by mouth nightly. 30 Tab 11    losartan (COZAAR) 50 mg tablet Take 1 Tab by mouth daily. 30 Tab 11    cloNIDine HCl (CATAPRES) 0.1 mg tablet Take 1 Tab by mouth nightly. 30 Tab 2    metFORMIN (GLUCOPHAGE) 500 mg tablet Take 1 Tab by mouth daily (with breakfast). 60 Tab 11    methocarbamol (ROBAXIN) 500 mg tablet Take 1 Tab by mouth four (4) times daily as needed for Pain (m. spasms). Do NOT Drive, may cause drowsiness 20 Tab 0    tamsulosin (FLOMAX) 0.4 mg capsule Take 1 Cap by mouth daily.  30 Cap 11    diclofenac (VOLTAREN) 1 % gel Apply 2 g to affected area every six (6) hours. 100 g 2    albuterol (PROVENTIL HFA, VENTOLIN HFA, PROAIR HFA) 90 mcg/actuation inhaler Take 2 Puffs by inhalation every four (4) hours as needed for Wheezing or Shortness of Breath. 1 Inhaler 0    nicotine (NICODERM CQ) 21 mg/24 hr 1 Patch by TransDERmal route every twenty-four (24) hours. 30 Patch 2     No Known Allergies    Objective:  Visit Vitals  /86 (BP 1 Location: Left arm, BP Patient Position: Sitting)   Pulse 84   Temp 98 °F (36.7 °C) (Oral)   Resp 17   Ht 5' 7\" (1.702 m)   Wt 156 lb (70.8 kg)   SpO2 99%   BMI 24.43 kg/m²     Wt Readings from Last 3 Encounters:   06/12/19 156 lb (70.8 kg)   01/09/19 163 lb 9.6 oz (74.2 kg)   12/12/18 164 lb (74.4 kg)     Physical Exam:   General appearance - alert, well appearing, and in no distress. Mental status - A/O x 4, normal mood and affect. Neck -Supple ,normal CSP. FROM, non-tender. No significant adenopathy/thyromegaly. No JVD. Chest -CTA. Symmetric chest rise. No rales, wheezing, or rhonchi. Heart - Normal rate, regular rhythm. Normal S1, S2. No MGR or clicks. Abdomen - Soft,non-distended. Normoactive BS in all quadrants. NT, no mass or HSM. Ext- Radial, DP pulses, 2+ bilaterally. No pedal edema, clubbing, or cyanosis. Skin-Warm and dry. No hyperpigmentation, ulcerations, or suspicious lesions. Neuro - Normal speech, no focal findings or movement disorder. Normal strength, gait, and muscle tone. AssessmentPlan:  The current medical regimen is effective;  continue present plan and medications. Medication Side Effects and Warnings were discussed with patient: yes   Patient Labs were reviewed: yes  Patient Past Records were reviewed: yes    See below for other orders       No diagnosis found. No orders of the defined types were placed in this encounter. Tim Moralez expressed understanding of plan.  An After Visit Summary was offered/printed and given to the patient.

## 2019-06-17 LAB
ALBUMIN SERPL-MCNC: 4.6 G/DL (ref 3.5–5.5)
ALBUMIN UR QL STRIP: 30 MG/L
ALBUMIN/GLOB SERPL: 1.9 {RATIO} (ref 1.2–2.2)
ALP SERPL-CCNC: 78 IU/L (ref 39–117)
ALT SERPL-CCNC: 29 IU/L (ref 0–44)
AST SERPL-CCNC: 17 IU/L (ref 0–40)
BASOPHILS # BLD AUTO: 0 X10E3/UL (ref 0–0.2)
BASOPHILS NFR BLD AUTO: 0 %
BILIRUB SERPL-MCNC: 0.4 MG/DL (ref 0–1.2)
BUN SERPL-MCNC: 21 MG/DL (ref 6–24)
BUN/CREAT SERPL: 24 (ref 9–20)
CALCIUM SERPL-MCNC: 8.8 MG/DL (ref 8.7–10.2)
CHLORIDE SERPL-SCNC: 103 MMOL/L (ref 96–106)
CHOLEST SERPL-MCNC: 138 MG/DL (ref 100–199)
CO2 SERPL-SCNC: 22 MMOL/L (ref 20–29)
CREAT SERPL-MCNC: 0.86 MG/DL (ref 0.76–1.27)
CREATININE, URINE POC: 300 MG/DL
EOSINOPHIL # BLD AUTO: 0.1 X10E3/UL (ref 0–0.4)
EOSINOPHIL NFR BLD AUTO: 3 %
ERYTHROCYTE [DISTWIDTH] IN BLOOD BY AUTOMATED COUNT: 12.9 % (ref 12.3–15.4)
EST. AVERAGE GLUCOSE BLD GHB EST-MCNC: 148 MG/DL
GLOBULIN SER CALC-MCNC: 2.4 G/DL (ref 1.5–4.5)
GLUCOSE SERPL-MCNC: 133 MG/DL (ref 65–99)
HBA1C MFR BLD: 6.8 % (ref 4.8–5.6)
HCT VFR BLD AUTO: 40.4 % (ref 37.5–51)
HDLC SERPL-MCNC: 57 MG/DL
HGB BLD-MCNC: 14.6 G/DL (ref 13–17.7)
IMM GRANULOCYTES # BLD AUTO: 0 X10E3/UL (ref 0–0.1)
IMM GRANULOCYTES NFR BLD AUTO: 0 %
INTERPRETATION, 910389: NORMAL
LDLC SERPL CALC-MCNC: 70 MG/DL (ref 0–99)
LYMPHOCYTES # BLD AUTO: 1.4 X10E3/UL (ref 0.7–3.1)
LYMPHOCYTES NFR BLD AUTO: 43 %
Lab: NORMAL
MCH RBC QN AUTO: 32.2 PG (ref 26.6–33)
MCHC RBC AUTO-ENTMCNC: 36.1 G/DL (ref 31.5–35.7)
MCV RBC AUTO: 89 FL (ref 79–97)
MICROALBUMIN/CREAT RATIO POC: <30 MG/G
MONOCYTES # BLD AUTO: 0.3 X10E3/UL (ref 0.1–0.9)
MONOCYTES NFR BLD AUTO: 10 %
NEUTROPHILS # BLD AUTO: 1.4 X10E3/UL (ref 1.4–7)
NEUTROPHILS NFR BLD AUTO: 44 %
PLATELET # BLD AUTO: 159 X10E3/UL (ref 150–450)
POTASSIUM SERPL-SCNC: 4.4 MMOL/L (ref 3.5–5.2)
PROT SERPL-MCNC: 7 G/DL (ref 6–8.5)
PSA SERPL-MCNC: 1.8 NG/ML (ref 0–4)
RBC # BLD AUTO: 4.54 X10E6/UL (ref 4.14–5.8)
REFLEX CRITERIA: NORMAL
SODIUM SERPL-SCNC: 141 MMOL/L (ref 134–144)
TRIGL SERPL-MCNC: 56 MG/DL (ref 0–149)
TSH SERPL DL<=0.005 MIU/L-ACNC: 1.56 UIU/ML (ref 0.45–4.5)
VLDLC SERPL CALC-MCNC: 11 MG/DL (ref 5–40)
WBC # BLD AUTO: 3.3 X10E3/UL (ref 3.4–10.8)

## 2019-09-12 ENCOUNTER — HOSPITAL ENCOUNTER (OUTPATIENT)
Dept: GENERAL RADIOLOGY | Age: 54
Discharge: HOME | End: 2019-09-12
Payer: MEDICAID

## 2019-09-12 ENCOUNTER — OFFICE VISIT (OUTPATIENT)
Dept: INTERNAL MEDICINE CLINIC | Age: 54
End: 2019-09-12

## 2019-09-12 VITALS
TEMPERATURE: 97.3 F | BODY MASS INDEX: 24.96 KG/M2 | RESPIRATION RATE: 17 BRPM | HEART RATE: 87 BPM | HEIGHT: 67 IN | DIASTOLIC BLOOD PRESSURE: 78 MMHG | SYSTOLIC BLOOD PRESSURE: 121 MMHG | OXYGEN SATURATION: 95 % | WEIGHT: 159 LBS

## 2019-09-12 DIAGNOSIS — E11.29 TYPE 2 DIABETES MELLITUS WITH MICROALBUMINURIA, WITHOUT LONG-TERM CURRENT USE OF INSULIN (HCC): ICD-10-CM

## 2019-09-12 DIAGNOSIS — I10 ESSENTIAL HYPERTENSION: ICD-10-CM

## 2019-09-12 DIAGNOSIS — M25.512 ACUTE PAIN OF BOTH SHOULDERS: Primary | ICD-10-CM

## 2019-09-12 DIAGNOSIS — M25.511 ACUTE PAIN OF BOTH SHOULDERS: Primary | ICD-10-CM

## 2019-09-12 DIAGNOSIS — Z12.11 SPECIAL SCREENING FOR MALIGNANT NEOPLASMS, COLON: ICD-10-CM

## 2019-09-12 DIAGNOSIS — M25.512 ACUTE PAIN OF BOTH SHOULDERS: ICD-10-CM

## 2019-09-12 DIAGNOSIS — M25.511 ACUTE PAIN OF BOTH SHOULDERS: ICD-10-CM

## 2019-09-12 DIAGNOSIS — R29.898 HAND WEAKNESS: ICD-10-CM

## 2019-09-12 DIAGNOSIS — F17.210 CIGARETTE NICOTINE DEPENDENCE WITHOUT COMPLICATION: ICD-10-CM

## 2019-09-12 DIAGNOSIS — B35.3 TINEA PEDIS OF BOTH FEET: ICD-10-CM

## 2019-09-12 DIAGNOSIS — R80.9 TYPE 2 DIABETES MELLITUS WITH MICROALBUMINURIA, WITHOUT LONG-TERM CURRENT USE OF INSULIN (HCC): ICD-10-CM

## 2019-09-12 PROCEDURE — 72050 X-RAY EXAM NECK SPINE 4/5VWS: CPT

## 2019-09-12 RX ORDER — CHLORPHENIRAMINE MALEATE 4 MG
TABLET ORAL 2 TIMES DAILY
Qty: 15 G | Refills: 0 | Status: SHIPPED | OUTPATIENT
Start: 2019-09-12

## 2019-09-12 RX ORDER — GABAPENTIN 100 MG/1
CAPSULE ORAL
Refills: 1 | COMMUNITY
Start: 2019-07-01

## 2019-09-12 RX ORDER — METHOCARBAMOL 500 MG/1
500 TABLET, FILM COATED ORAL
Qty: 40 TAB | Refills: 2 | Status: SHIPPED | OUTPATIENT
Start: 2019-09-12

## 2019-09-12 RX ORDER — CLOTRIMAZOLE AND BETAMETHASONE DIPROPIONATE 10; .64 MG/G; MG/G
CREAM TOPICAL
Refills: 5 | COMMUNITY
Start: 2019-07-10 | End: 2019-09-12 | Stop reason: ALTCHOICE

## 2019-09-12 RX ORDER — BETAMETHASONE DIPROPIONATE 0.5 MG/ML
LOTION, AUGMENTED TOPICAL 2 TIMES DAILY
Qty: 30 ML | Refills: 0 | Status: SHIPPED | OUTPATIENT
Start: 2019-09-12 | End: 2019-09-18 | Stop reason: RX

## 2019-09-12 RX ORDER — NAPROXEN 500 MG/1
500 TABLET ORAL
Qty: 20 TAB | Refills: 0 | Status: SHIPPED | OUTPATIENT
Start: 2019-09-12 | End: 2019-09-22

## 2019-09-12 RX ORDER — DICLOFENAC SODIUM 10 MG/G
2 GEL TOPICAL 4 TIMES DAILY
Qty: 100 G | Refills: 11 | Status: SHIPPED | OUTPATIENT
Start: 2019-09-12 | End: 2019-10-12

## 2019-09-12 NOTE — PATIENT INSTRUCTIONS
Use heat for 20 minutes, followed by topical ointments like Aspercreme, Tiger Balm, or Capsaicin. May also use ASPERCREME with Lidocaine or [employer] PAIN PATCHES, available over the counter. If pain continues take Tylenol arthritis up to 3 times daily for your pain. Use heat BEFORE activity/physical therapy and ice AFTER for about 20-30 minutes. May take NSAIDS, like Advil, Ibuprofen, or Aleve for moderate pain. Wait 1 hr, if your pain continues, take your muscle relaxer. Pinched Nerve in the Neck: Care Instructions  Your Care Instructions  A pinched nerve in the neck happens when a vertebra or disc in the upper part of your spine is damaged. This damage can happen because of an injury. Or it can just happen with age. The changes caused by the damage may put pressure on a nearby nerve root, pinching it. This causes symptoms such as sharp pain in your neck, shoulder, arm, hand, or back. You may also have tingling or numbness. Sometimes it makes your arm weaker. The symptoms are usually worse when you turn your head or strain your neck. For many people, the symptoms get better over time and finally go away. Early treatment usually includes medicines for pain and swelling. Sometimes physical therapy and special exercises may help. Follow-up care is a key part of your treatment and safety. Be sure to make and go to all appointments, and call your doctor if you are having problems. It's also a good idea to know your test results and keep a list of the medicines you take. How can you care for yourself at home? · Be safe with medicines. Read and follow all instructions on the label. ? If the doctor gave you a prescription medicine for pain, take it as prescribed. ? If you are not taking a prescription pain medicine, ask your doctor if you can take an over-the-counter medicine. · Try using a heating pad on a low or medium setting for 15 to 20 minutes every 2 or 3 hours.  Try a warm shower in place of one session with the heating pad. You can also buy single-use heat wraps that last up to 8 hours. · You can also try an ice pack for 10 to 15 minutes every 2 to 3 hours. There isn't strong evidence that either heat or ice will help. But you can try them to see if they help you. · Don't spend too long in one position. Take short breaks to move around and change positions. · Wear a seat belt and shoulder harness when you are in a car. · Sleep with a pillow under your head and neck that keeps your neck straight. · If you were given a neck brace (cervical collar) to limit neck motion, wear it as instructed for as many days as your doctor tells you to. Do not wear it longer than you were told to. Wearing a brace for too long can lead to neck stiffness and can weaken the neck muscles. · Follow your doctor's instructions for gentle neck-stretching exercises. · Do not smoke. Smoking can slow healing of your discs. If you need help quitting, talk to your doctor about stop-smoking programs and medicines. These can increase your chances of quitting for good. · Avoid strenuous work or exercise until your doctor says it is okay. When should you call for help? Call 911 anytime you think you may need emergency care. For example, call if:    · You are unable to move an arm or a leg at all.   [facility] your doctor now or seek immediate medical care if:    · You have new or worse symptoms in your arms, legs, chest, belly, or buttocks. Symptoms may include:  ? Numbness or tingling. ? Weakness. ? Pain.     · You lose bladder or bowel control.    Watch closely for changes in your health, and be sure to contact your doctor if:    · You are not getting better as expected. Where can you learn more? Go to http://sterling-bharti.info/. Enter T860 in the search box to learn more about \"Pinched Nerve in the Neck: Care Instructions. \"  Current as of: September 20, 2018  Content Version: 12.1  © 8252-3996 [employer]wise, Incorporated. Care instructions adapted under license by [employer] (which disclaims liability or warranty for this information). If you have questions about a medical condition or this instruction, always ask your healthcare professional. [address] any warranty or liability for your use of this information. Cervical Spondylosis: Care Instructions  Your Care Instructions    Cervical spondylosis is a type of arthritis of the neck. It can happen as people get older. It may be caused by bone spurs or other problems. You may have neck pain and stiffness. Sometimes the space around the spinal cord narrows. When this happens, it is called spinal stenosis. Spinal stenosis can cause pain, numbness, or weakness in the arms, legs, feet, and rear end (buttocks). It can also cause loss of bowel and bladder control. You can treat some of your symptoms with over-the-counter pain medicine. But if you have spinal stenosis with severe symptoms, you may need surgery. Follow-up care is a key part of your treatment and safety. Be sure to make and go to all appointments, and call your doctor if you are having problems. It's also a good idea to know your test results and keep a list of the medicines you take. How can you care for yourself at home? · Take anti-inflammatory medicines to reduce neck pain. These include ibuprofen (Advil, Motrin) and naproxen (Aleve). Be safe with medicines. Read and follow all instructions on the label. · Follow your doctor's recommendation about activity. He or she may tell you not to do sports or activities that could injure your neck. When should you call for help? Call 911 anytime you think you may need emergency care. For example, call if:    · You are unable to move an arm or a leg at all.   [facility] your doctor now or seek immediate medical care if:    · You have new or worse symptoms in your arms, legs, belly, or buttocks.  Symptoms may include:  ? Numbness or tingling. ? Weakness. ? Pain.     · You lose bladder or bowel control.    Watch closely for changes in your health, and be sure to contact your doctor if:    · You do not get better as expected. Where can you learn more? Go to http://sterling-bharti.info/. Enter V048 in the search box to learn more about \"Cervical Spondylosis: Care Instructions. \"  Current as of: September 20, 2018  Content Version: 12.1  © 5544-0779 [employer]wise, Incorporated. Care instructions adapted under license by [employer] (which disclaims liability or warranty for this information). If you have questions about a medical condition or this instruction, always ask your healthcare professional. [address] any warranty or liability for your use of this information.

## 2019-09-12 NOTE — PROGRESS NOTES
Pt is here for   Chief Complaint   Patient presents with    Follow-up      HTN    Shoulder Pain     bilateral           Pt states pain level is a 3/10 shoulders    1. Have you been to the ER, urgent care clinic since your last visit? Hospitalized since your last visit? No    2. Have you seen or consulted any other health care providers outside of the [address] [place] [address] since your last visit? Include any pap smears or colon screening.  No

## 2019-09-12 NOTE — PROGRESS NOTES
Lorenz Duverney is a 53 y.o. male and presents with Follow-up ( HTN) and Shoulder Pain (bilateral)    Subjective:  Shoulder Pain Review:  Patient complains of bilateral shoulder pain. The symptoms began several months ago. Course of symptoms since onset has been gradually worsening. Pain is described as overall severity = moderate, worse in the mornings. Symptoms were incited by no known event. Patient denies numbness, tingling; but has intermittent weaker . Therapy to date includes nothing. Hypertension Review:  The patient has hypertension  Diet and Lifestyle: generally does try to follow a  low sodium diet,  Exercises rarely. Home BP Monitoring: is NOT measured at home  Pertinent ROS: taking medications as instructed, no medication side effects noted. No TIA's, chest pain on exertion, dyspnea on exertion, or swelling of ankles. Continues to smoke 1 PPD after each meal and drinks about 2 beers NIGHTLY. BP Readings from Last 3 Encounters:   09/12/19 121/78   06/12/19 145/86   01/09/19 (!) 156/95     Review of Systems  Constitutional: negative for fevers, chills, anorexia and weight loss  Respiratory:  negative for hemoptysis, dyspnea  CV:   negative for chest pain, palpitations, and lower extremity edema  GI:   negative for nausea, vomiting, diarrhea, abdominal pain, and melena  Endo:               +DM. negative for polyuria,polydipsia,polyphagia, and heat intolerance  Genitourinary: negative for frequency, urgency, dysuria, retention, and hematuria  Integument:  +foot rash improving with lotrisone, but needs a different brand or to be written differently to get covered by insurance. negative for  Ulcerations.   Hematologic:  negative for easy bruising and bleeding  Musculoskel: negative for arthralgias, muscle weakness,and joint pain/swelling  Neurological:  negative for headaches, dizziness, vertigo,and memory/gait problems  Behavl/Psych: negative for feelings of anxiety, depression, suicide, and mood changes    Past Medical History:   Diagnosis Date    Diabetes ([facility] [address]     Hypertension      History reviewed. No pertinent surgical history. Social History     Socioeconomic History    Marital status:      Spouse name: Not on file    Number of children: Not on file    Years of education: Not on file    Highest education level: Not on file   Tobacco Use    Smoking status: Current Every Day Smoker     Packs/day: 1.00    Smokeless tobacco: Never Used   Substance and Sexual Activity    Alcohol use: No    Drug use: No    Sexual activity: Yes     Partners: Female     History reviewed. No pertinent family history. Current Outpatient Medications   Medication Sig Dispense Refill    clotrimazole (LOTRIMIN) 1 % topical cream Apply  to affected area two (2) times a day. 15 g 0    betamethasone, augmented (DIPROLENE) 0.05 % lotion Apply  to affected area two (2) times a day. 30 mL 0    naproxen (NAPROSYN) 500 mg tablet Take 1 Tab by mouth two (2) times daily as needed for Pain for up to 10 days. With food 20 Tab 0    VOLTAREN 1 % gel Apply 2 g to affected area four (4) times daily for 30 days. 100 g 11    methocarbamol (ROBAXIN) 500 mg tablet Take 1 Tab by mouth four (4) times daily as needed for Pain (m. spasms). Do NOT Drive, may cause drowsiness 40 Tab 2    atorvastatin (LIPITOR) 20 mg tablet Take 1 Tab by mouth nightly. 30 Tab 11    losartan (COZAAR) 50 mg tablet Take 1 Tab by mouth daily. 30 Tab 11    metFORMIN (GLUCOPHAGE) 500 mg tablet Take 1 Tab by mouth daily (with breakfast). 60 Tab 11    gabapentin (NEURONTIN) 100 mg capsule TAKE 1 CAPSULE BY MOUTH ONCE A DAY FOR 1 WEEK. THEN TAKE 1 CAPSULE BY MOUTH TWICE A DAY FOR 1 WEEK. THEN TAKE 1 CAPSULE BY MOUTH THREE TIMES  1    Blood-Glucose Meter monitoring kit Check blood sugar daily. May substitute for insurance preferred meter 1 Kit 0    lancets misc Check blood sugar 2 times daily:E11.29. May substitute for insurance preferred lancets.  100 Each 11    glucose blood VI test strips (BLOOD GLUCOSE TEST) strip Check blood sugar 2 times daily: E11.29, may substitute for insurance preferred strips. 100 Strip 11    aspirin delayed-release 81 mg tablet Take 1 Tab by mouth daily. Indications: CV event prevention 30 Tab 11    cloNIDine HCl (CATAPRES) 0.1 mg tablet Take 1 Tab by mouth nightly. 30 Tab 2    albuterol (PROVENTIL HFA, VENTOLIN HFA, PROAIR HFA) 90 mcg/actuation inhaler Take 2 Puffs by inhalation every four (4) hours as needed for Wheezing or Shortness of Breath. 1 Inhaler 0    tamsulosin (FLOMAX) 0.4 mg capsule Take 1 Cap by mouth daily. 30 Cap 11    nicotine (NICODERM CQ) 21 mg/24 hr 1 Patch by TransDERmal route every twenty-four (24) hours. 30 Patch 2     No Known Allergies    Objective:  Visit Vitals  /78 (BP 1 Location: Left arm, BP Patient Position: Sitting)   Pulse 87   Temp 97.3 °F (36.3 °C) (Oral)   Resp 17   Ht 5' 7\" (1.702 m)   Wt 159 lb (72.1 kg)   SpO2 95%   BMI 24.90 kg/m²     Wt Readings from Last 3 Encounters:   09/12/19 159 lb (72.1 kg)   06/12/19 156 lb (70.8 kg)   01/09/19 163 lb 9.6 oz (74.2 kg)     Physical Exam:   General appearance - alert, well appearing, and in no distress. Mental status - A/O x 4, normal mood and affect. Neck -Supple ,normal CSP. FROM, non-tender. No significant adenopathy/thyromegaly. No JVD. Chest -Rhonchi in bases, clear in upper lung fields. Symmetric chest rise. No wheezing or rales. +weak cough. Heart - Normal rate, regular rhythm. Normal S1, S2. No MGR or clicks. Abdomen - Soft,non-distended. Normoactive BS in all quadrants. NT, no mass or HSM. Ext- Radial, DP pulses, 2+ bilaterally. No pedal edema, clubbing, or cyanosis. Skin-Warm and dry. No hyperpigmentation, ulcerations, or suspicious lesions. Neuro - Normal speech, no focal findings or movement disorder. Normal strength, gait, and muscle tone. Shoulder exam: FROM, but pain beyond 90 degrees.   Right shoulder: No erythema, edema, or bruising. Nontender to acromioclavicular joint. Nontender to subacromial bursa. No impingement signs. +crepitus. Trapezius TTP. Left shoulder: No erythema, edema, or bruising. Nontender to acromioclavicular joint. Nontender to subacromial bursa. No impingement signs.  +crepitus. Trapezius TTP. AssessmentPlan:  Xray ordered. Voltaren gel, robaxin, and naproxen ordered. The current medical regimen is effective;  continue present plan and medications. Medication Side Effects and Warnings were discussed with patient: yes   Patient Labs were reviewed: yes  Patient Past Records were reviewed: yes    See below for other orders   Follow-up and Dispositions    · Return in about 3 months (around 12/12/2019) for DM, CHOL, HTN, smok cess, neck/shoulder. ICD-10-CM ICD-9-CM    1. Acute pain of both shoulders M25.511 719.41 XR SPINE CERV 4 OR 5 V    M25.512  naproxen (NAPROSYN) 500 mg tablet   2. Tinea pedis of both feet B35.3 110.4 clotrimazole (LOTRIMIN) 1 % topical cream      betamethasone, augmented (DIPROLENE) 0.05 % lotion   3. Hand weakness R29.898 728.87 XR SPINE CERV 4 OR 5 V   4. Special screening for malignant neoplasms, colon Z12.11 V76.51 COLOGUARD TEST (FECAL DNA COLORECTAL CANCER SCREENING)   5. Type 2 diabetes mellitus with microalbuminuria, without long-term current use of insulin (HCC) E11.29 250.40 REFERRAL TO OPHTHALMOLOGY    R80.9 791.0    6. Cigarette nicotine dependence without complication Y94.354 931.5    7.  Essential hypertension I10 401.9      Orders Placed This Encounter    XR SPINE CERV 4 OR 5 V     Standing Status:   Future     Standing Expiration Date:   10/12/2020     Order Specific Question:   Reason for Exam     Answer:   bilateral shoulder pain, neck pain    COLOGUARD TEST (FECAL DNA COLORECTAL CANCER SCREENING)    REFERRAL TO OPHTHALMOLOGY     Referral Priority:   Routine     Referral Type:   Consultation     Referral Reason:   Specialty Services Required Referral Location:   Lui Nettles M.D. Referred to Provider:   Luis Armando Hartmann MD    gabapentin (NEURONTIN) 100 mg capsule     Sig: TAKE 1 CAPSULE BY MOUTH ONCE A DAY FOR 1 WEEK. THEN TAKE 1 CAPSULE BY MOUTH TWICE A DAY FOR 1 WEEK. THEN TAKE 1 CAPSULE BY MOUTH THREE TIMES     Refill:  1    DISCONTD: clotrimazole-betamethasone (LOTRISONE) topical cream     Sig: APPLY CREAM TWICE DAILY FOR 30 DAYS     Refill:  5    clotrimazole (LOTRIMIN) 1 % topical cream     Sig: Apply  to affected area two (2) times a day. Dispense:  15 g     Refill:  0    betamethasone, augmented (DIPROLENE) 0.05 % lotion     Sig: Apply  to affected area two (2) times a day. Dispense:  30 mL     Refill:  0    naproxen (NAPROSYN) 500 mg tablet     Sig: Take 1 Tab by mouth two (2) times daily as needed for Pain for up to 10 days. With food     Dispense:  20 Tab     Refill:  0    VOLTAREN 1 % gel     Sig: Apply 2 g to affected area four (4) times daily for 30 days. Dispense:  100 g     Refill:  11    methocarbamol (ROBAXIN) 500 mg tablet     Sig: Take 1 Tab by mouth four (4) times daily as needed for Pain (m. spasms). Do NOT Drive, may cause drowsiness     Dispense:  40 Tab     Refill:  2       Tressia Common expressed understanding of plan. An After Visit Summary was offered/printed and given to the patient.

## 2019-09-13 PROBLEM — M47.812 SPONDYLOSIS OF CERVICAL REGION WITHOUT MYELOPATHY OR RADICULOPATHY: Status: ACTIVE | Noted: 2019-09-13

## 2019-09-16 ENCOUNTER — TELEPHONE (OUTPATIENT)
Dept: INTERNAL MEDICINE CLINIC | Age: 54
End: 2019-09-16

## 2019-09-16 NOTE — TELEPHONE ENCOUNTER
He was called to for appointment reminder for his wife tomorrow. Spoke with him and he was informed.  Pt states that he listened to the voicemail message and will be in tomorrow with his wife

## 2019-09-18 ENCOUNTER — DOCUMENTATION ONLY (OUTPATIENT)
Dept: INTERNAL MEDICINE CLINIC | Age: 54
End: 2019-09-18

## 2019-09-18 RX ORDER — BETAMETHASONE VALERATE 1.2 MG/G
CREAM TOPICAL 2 TIMES DAILY
Qty: 15 G | Refills: 0 | Status: SHIPPED | OUTPATIENT
Start: 2019-09-18

## 2022-10-23 NOTE — Clinical Note
Sorry, can you call and advise him that I did CHANGE his meds SINCE HE REPORTED worsening cough. I am STOPPING the combo tab and  the meds again. He will CONTINUE Losartan, but at the lower dose of 50 mg. CHANGED HCTZ to chlorthalidone instead at higher dose, so he will urinate more. He should be sure to take BOTH in the morning. Keep the f/u in 2 weeks to recheck BP and sorry so many changes are needed, just want to get his BP under control better. Thanks Lucrecia. at times/alert/confused
